# Patient Record
Sex: FEMALE | Employment: FULL TIME | URBAN - METROPOLITAN AREA
[De-identification: names, ages, dates, MRNs, and addresses within clinical notes are randomized per-mention and may not be internally consistent; named-entity substitution may affect disease eponyms.]

---

## 2017-03-23 ENCOUNTER — OFFICE VISIT (OUTPATIENT)
Dept: FAMILY MEDICINE | Facility: CLINIC | Age: 32
End: 2017-03-23

## 2017-03-23 VITALS
TEMPERATURE: 98.2 F | SYSTOLIC BLOOD PRESSURE: 105 MMHG | OXYGEN SATURATION: 96 % | WEIGHT: 148.04 LBS | DIASTOLIC BLOOD PRESSURE: 72 MMHG | HEART RATE: 67 BPM

## 2017-03-23 DIAGNOSIS — J02.9 SORE THROAT: Primary | ICD-10-CM

## 2017-03-23 LAB
BACTERIA SPEC CULT: NORMAL
MICRO REPORT STATUS: NORMAL
S PYO AG THROAT QL IA.RAPID: NEGATIVE
SPECIMEN SOURCE: NORMAL

## 2017-03-23 RX ORDER — SERTRALINE HYDROCHLORIDE 100 MG/1
TABLET, FILM COATED ORAL DAILY
COMMUNITY
End: 2017-09-28

## 2017-03-23 RX ORDER — PENICILLIN V POTASSIUM 500 MG/1
500 TABLET, FILM COATED ORAL 2 TIMES DAILY
Qty: 20 TABLET | Refills: 0 | Status: SHIPPED | OUTPATIENT
Start: 2017-03-23 | End: 2017-09-28

## 2017-03-23 ASSESSMENT — PAIN SCALES - GENERAL: PAINLEVEL: MODERATE PAIN (5)

## 2017-03-23 NOTE — MR AVS SNAPSHOT
After Visit Summary   3/23/2017    Gabby Thomas    MRN: [id number]           Patient Information     Date Of Birth          1985        Visit Information        Provider Department      3/23/2017 11:40 AM Edil Pérez MD [facility]        Today's Diagnoses     Sore throat    -  1      Care Instructions    PLAN:  - Although rapid strep was negative, will treat with Pen for 10 days.   -Return to clinic if no improvement.  -Also, consider scheduling a visit for primary care as needed.         Follow-ups after your visit        Who to contact     Please call your clinic at [phone] to:    Ask questions about your health    Make or cancel appointments    Discuss your medicines    Learn about your test results    Speak to your doctor   If you have compliments or concerns about an experience at your clinic, or if you wish to file a complaint, please contact [facility] Patient Relations at [phone] or email us at [email or web address]         Additional Information About Your Visit        MyChart Information     AIRSIS is an electronic gateway that provides easy, online access to your medical records. With AIRSIS, you can request a clinic appointment, read your test results, renew a prescription or communicate with your care team.     To sign up for AIRSIS visit the website at [email or web address]   You will be asked to enter the access code listed below, as well as some personal information. Please follow the directions to create your username and password.     Your access code is: [email or web address]  Expires: 2017 10:06 AM     Your access code will  in 90 days. If you need help or a new code, please contact your [facility] or call [phone] for assistance.        Care EveryWhere ID     This is your Care EveryWhere ID. This could be used by other organizations to access your  [place] medical records  [id number]        Your Vitals Were     Pulse Temperature Last Period Pulse Oximetry Breastfeeding?       67 98.2  F (36.8  C) 02/23/2017 96% No        Blood Pressure from Last 3 Encounters:   03/23/17 105/72    Weight from Last 3 Encounters:   03/23/17 148 lb 0.6 oz (67.2 kg)              We Performed the Following     Rapid Strep Screen (Group) ([place])     Throat Culture Aerobic Bacterial          Today's Medication Changes          These changes are accurate as of: 3/23/17 12:03 PM.  If you have any questions, ask your nurse or doctor.               Start taking these medicines.        Dose/Directions    penicillin V potassium 500 MG tablet   Commonly known as:  VEETID   Used for:  Sore throat   Started by:  Edil Pérez MD        Dose:  500 mg   Take 1 tablet (500 mg) by mouth 2 times daily   Quantity:  20 tablet   Refills:  0            Where to get your medicines      These medications were sent to [place] [address] [place] [address]  [address] [place] [address]     Phone:  [phone]     penicillin V potassium 500 MG tablet                Primary Care Provider    No Pcp Confirmed       No address on file        Thank you!     Thank you for choosing [facility]  for your care. Our goal is always to provide you with excellent care. Hearing back from our patients is one way we can continue to improve our services. Please take a few minutes to complete the written survey that you may receive in the mail after your visit with us. Thank you!             Your Updated Medication List - Protect others around you: Learn how to safely use, store and throw away your medicines at www.disposemymeds.org.          This list is accurate as of: 3/23/17 12:03 PM.  Always use your most recent med list.                   Brand Name Dispense Instructions for use    MULTIVITAMINS PO          penicillin V potassium 500 MG tablet    VEETID    20 tablet     Take 1 tablet (500 mg) by mouth 2 times daily       sertraline 100 MG tablet    ZOLOFT     Take by mouth daily

## 2017-03-23 NOTE — LETTER
[employer] Customer Service  [facility]  [address], [address]  [place] [address]  Fax: [phone]  Phone: [phone]      2017      Zach Thomas  [address] [place] [facility] [address]        Dear Zach,    Thank you for your interest in becoming a [employer] user!    Your access code is: [email or web address]  Expires: 2017 10:06 AM     Please access the [employer] website at [email or web address].  Below the ID and password fields, select the  Sign Up Now  as New User.  You will be prompted to enter the access code listed above as well as additional personal information.  Please follow the directions carefully when creating your username and password.    If you allow your access code to , or if you have any questions please call a [employer] Representative at [phone] during normal clinic hours.     Sincerely,      [employer] Customer Service  [facility]

## 2017-03-23 NOTE — PROGRESS NOTES
Gabby Thomas is a 21 year old female who presents to [facility] for her first visit.   Concern is sore throat for 2 days. Her  went to Emergency room with Strep. Daughter also tested positive for Strep.   Her 1.5 year old son has a URI.    Review Of Systems:  Has otherwise been in usual state of health, e.g.   Cardiovascular: negative  Respiratory: No shortness of breath, dyspnea on exertion, cough, or hemoptysis  Gastrointestinal: negative  Genitourinary: negative    Problem list per EMR:  There is no problem list on file for this patient.      Current Outpatient Prescriptions   Medication Sig Dispense Refill     sertraline (ZOLOFT) 100 MG tablet Take by mouth daily       Multiple Vitamin (MULTIVITAMINS PO)          Allergies no known allergies     Social:    From [place]. 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     EXAM  Vitals: /72 (BP Location: Left arm, Patient Position: Chair, Cuff Size: Adult Large)  Pulse 67  Temp 98.2  F (36.8  C)  Wt 148 lb 0.6 oz (67.2 kg)  LMP 02/23/2017  SpO2 96%  Breastfeeding? No  BMI= There is no height or weight on file to calculate BMI.  Appears well, but tonsils are enlarged, and red. No exudate. No noticeable lymphadenopathy. Neck is supple.  Tms are normal  CV - RRR. No murmur   Lungs- CTA    ASSESSMENT:  -Pharyngitis in a 32 yo who has 2 family members with Lab confirmed positive Strep    PLAN:  - Although rapid strep was negative, will treat with Pen for 10 days.   -Return to clinic if no improvement.  -Also, consider scheduling a visit for primary care as needed.       --Edil Pérez MD  [facility], Department of Family Medicine and Community Health

## 2017-03-23 NOTE — NURSING NOTE
21 year old  Chief Complaint   Patient presents with     Pharyngitis     Both her  and daugther was diagnosed was Strep. She developed throat pain x2 days ago.       Blood pressure 105/72, pulse 67, temperature 98.2  F (36.8  C), weight 148 lb 0.6 oz (67.2 kg), last menstrual period 02/23/2017, SpO2 96 %, not currently breastfeeding. There is no height or weight on file to calculate BMI.  There is no problem list on file for this patient.      Wt Readings from Last 2 Encounters:   03/23/17 148 lb 0.6 oz (67.2 kg)     BP Readings from Last 3 Encounters:   03/23/17 105/72         Current Outpatient Prescriptions   Medication     sertraline (ZOLOFT) 100 MG tablet     Multiple Vitamin (MULTIVITAMINS PO)     No current facility-administered medications for this visit.        Social History   Substance Use Topics     Smoking status: Never Smoker     Smokeless tobacco: Not on file     Alcohol use Yes       Health Maintenance Due   Topic Date Due     CHLAMYDIA SCREENING  08/14/1994     HPV IMMUNIZATION (1 of 3 - Female 3 Dose Series) 08/14/2005     TETANUS IMMUNIZATION (SYSTEM ASSIGNED)  08/14/2012     PAP SCREENING Q3 YR (SYSTEM ASSIGNED)  08/14/2015       No results found for: PAP      Yessenia Levy, JOSE LUIS  March 23, 2017 11:32 AM

## 2017-03-23 NOTE — PATIENT INSTRUCTIONS
PLAN:  - Although rapid strep was negative, will treat with Pen for 10 days.   -Return to clinic if no improvement.  -Also, consider scheduling a visit for primary care as needed.

## 2017-09-28 ENCOUNTER — OFFICE VISIT (OUTPATIENT)
Dept: OBGYN | Facility: CLINIC | Age: 32
End: 2017-09-28
Attending: OBSTETRICS & GYNECOLOGY
Payer: COMMERCIAL

## 2017-09-28 VITALS
HEIGHT: 57 IN | SYSTOLIC BLOOD PRESSURE: 111 MMHG | BODY MASS INDEX: 32.15 KG/M2 | DIASTOLIC BLOOD PRESSURE: 72 MMHG | WEIGHT: 149 LBS

## 2017-09-28 DIAGNOSIS — Z11.3 ROUTINE SCREENING FOR STI (SEXUALLY TRANSMITTED INFECTION): ICD-10-CM

## 2017-09-28 DIAGNOSIS — R68.89 HEAT INTOLERANCE: ICD-10-CM

## 2017-09-28 DIAGNOSIS — M54.50 CHRONIC BILATERAL LOW BACK PAIN WITHOUT SCIATICA: ICD-10-CM

## 2017-09-28 DIAGNOSIS — G89.29 CHRONIC BILATERAL LOW BACK PAIN WITHOUT SCIATICA: ICD-10-CM

## 2017-09-28 DIAGNOSIS — Z30.431 IUD CHECK UP: ICD-10-CM

## 2017-09-28 DIAGNOSIS — K52.9 CHRONIC DIARRHEA: ICD-10-CM

## 2017-09-28 DIAGNOSIS — Z00.00 ROUTINE GENERAL MEDICAL EXAMINATION AT A HEALTH CARE FACILITY: Primary | ICD-10-CM

## 2017-09-28 PROCEDURE — 99212 OFFICE O/P EST SF 10 MIN: CPT | Mod: ZF

## 2017-09-28 PROCEDURE — 87591 N.GONORRHOEAE DNA AMP PROB: CPT | Performed by: OBSTETRICS & GYNECOLOGY

## 2017-09-28 PROCEDURE — 87491 CHLMYD TRACH DNA AMP PROBE: CPT | Performed by: OBSTETRICS & GYNECOLOGY

## 2017-09-28 RX ORDER — COPPER 313.4 MG/1
1 INTRAUTERINE DEVICE INTRAUTERINE ONCE
COMMUNITY
End: 2021-12-01

## 2017-09-28 ASSESSMENT — PAIN SCALES - GENERAL: PAINLEVEL: SEVERE PAIN (6)
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
## 2017-09-28 NOTE — MR AVS SNAPSHOT
After Visit Summary   9/28/2017    Gabby Lennon    MRN: [id number]           Patient Information     Date Of Birth          1985        Visit Information        Provider Department      9/28/2017 3:45 PM Marta Ross MD [facility]        Today's Diagnoses     Heat intolerance    -  1    Chronic diarrhea        Routine screening for STI (sexually transmitted infection)        Chronic bilateral low back pain without sciatica        IUD check up          Care Instructions    Go to lab for blood work, urine and stool sample    Schedule pelvic Ultrasound    Physical therapy will call to schedule an appointment    Follow up with internal medicine regarding diarrhea and flushing          Follow-ups after your visit        Additional Services     [facility] Referral       **This order will print in the [facility] Scheduling Office**    Physical Therapy, Hand Therapy and Chiropractic Care are available through:    *[facility]  *[facility]  *[facility]    Call one number to schedule at any of the above locations: [phone].    Your provider has referred you to: Physical Therapy at [facility] or [facility]    Indication/Reason for Referral: Low Back Pain  Onset of Illness: 4 years  Therapy Orders: Evaluate and Treat  Special Programs: None  Special Request: None    Kj Underwood      Additional Comments for the Therapist or Chiropractor: none    Please be aware that coverage of these services is subject to the terms and limitations of your health insurance plan.  Call member services at your health plan with any benefit or coverage questions.      Please bring the following to your appointment:    *Your personal calendar for scheduling future appointments  *Comfortable clothing                  Future tests that were ordered for you today     Open Future Orders        Priority Expected Expires Ordered    US GYN  "Complete Transvaginal - 28010 (In Clinic) Routine  1/26/2018 9/28/2017    5-HIAA quantitative urine Routine  10/28/2017 9/28/2017    Ova and Parasite Exam Routine Routine  9/28/2018 9/28/2017            Who to contact     Please call your clinic at [phone] to:    Ask questions about your health    Make or cancel appointments    Discuss your medicines    Learn about your test results    Speak to your doctor   If you have compliments or concerns about an experience at your clinic, or if you wish to file a complaint, please contact [facility] Patient Relations at [phone] or email us at [email or web address]         Additional Information About Your Visit        Care EveryWhere ID     This is your Care EveryWhere ID. This could be used by other organizations to access your [place] medical records  [id number]        Your Vitals Were     Height Last Period BMI (Body Mass Index)             1.448 m (4' 9\") 09/04/2017 32.24 kg/m2          Blood Pressure from Last 3 Encounters:   09/28/17 111/72   03/23/17 105/72   01/21/16 106/72    Weight from Last 3 Encounters:   09/28/17 67.6 kg (149 lb)   03/23/17 67.2 kg (148 lb 0.6 oz)   01/21/16 64 kg (141 lb)              We Performed the Following     Anti Treponema     CBC with platelets     Chlamydia PCR (Clinic Collect)     Gonorrhea PCR     Hepatitis C antibody     HIV Antigen Antibody Combo     [facility] Referral     TSH with free T4 reflex          Today's Medication Changes          These changes are accurate as of: 9/28/17  4:41 PM.  If you have any questions, ask your nurse or doctor.               These medicines have changed or have updated prescriptions.        Dose/Directions    sertraline 100 MG tablet   Commonly known as:  ZOLOFT   This may have changed:  Another medication with the same name was removed. Continue taking this medication, and follow the directions you see here.   Used for:  Mixed emotional " features as adjustment reaction   Changed by:  Tonie Paulson MD        Dose:  100 mg   Take 1 tablet (100 mg) by mouth daily   Quantity:  90 tablet   Refills:  3         Stop taking these medicines if you haven't already. Please contact your care team if you have questions.     MULTIVITAMINS PO   Stopped by:  Marta Ross MD           naproxen 500 MG tablet   Commonly known as:  NAPROSYN   Stopped by:  Marta Ross MD           penicillin V potassium 500 MG tablet   Commonly known as:  VEETID   Stopped by:  Marta Ross MD                    Primary Care Provider    None Specified       No primary provider on file.        Equal Access to Services     [facility]: Hadii kirti Prakash, corby rutherford, cole kaalmamaggi davis, omari mcdaniels . So [facility] [phone].    ATENCIÓN: Si habla español, tiene a diallo disposición servicios gratuitos de asistencia lingüística. [facility] [phone].    We comply with applicable federal civil rights laws and [place] laws. We do not discriminate on the basis of race, color, national origin, age, disability sex, sexual orientation or gender identity.            Thank you!     Thank you for choosing [facility]  for your care. Our goal is always to provide you with excellent care. Hearing back from our patients is one way we can continue to improve our services. Please take a few minutes to complete the written survey that you may receive in the mail after your visit with us. Thank you!             Your Updated Medication List - Protect others around you: Learn how to safely use, store and throw away your medicines at www.disposemymeds.org.          This list is accurate as of: 9/28/17  4:41 PM.  Always use your most recent med list.                   Brand Name Dispense Instructions for use Diagnosis    paragard intrauterine copper      1 each by Intrauterine route once  Inserted 12-6-2016        prenatal multivitamin plus iron 27-0.8 MG Tabs per tablet     100 tablet    Take 1 tablet by mouth daily    Routine postpartum follow-up       sertraline 100 MG tablet    ZOLOFT    90 tablet    Take 1 tablet (100 mg) by mouth daily    Mixed emotional features as adjustment reaction
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
## 2017-09-29 LAB
C TRACH DNA SPEC QL NAA+PROBE: NEGATIVE
N GONORRHOEA DNA SPEC QL NAA+PROBE: NEGATIVE
SPECIMEN SOURCE: NORMAL
SPECIMEN SOURCE: NORMAL

## 2017-10-02 DIAGNOSIS — Z11.3 ROUTINE SCREENING FOR STI (SEXUALLY TRANSMITTED INFECTION): ICD-10-CM

## 2017-10-02 DIAGNOSIS — K52.9 CHRONIC DIARRHEA: ICD-10-CM

## 2017-10-02 DIAGNOSIS — R68.89 HEAT INTOLERANCE: ICD-10-CM

## 2017-10-02 LAB
ERYTHROCYTE [DISTWIDTH] IN BLOOD BY AUTOMATED COUNT: 13 % (ref 10–15)
HCT VFR BLD AUTO: 40.3 % (ref 35–47)
HCV AB SERPL QL IA: NONREACTIVE
HGB BLD-MCNC: 13.4 G/DL (ref 11.7–15.7)
HIV 1+2 AB+HIV1 P24 AG SERPL QL IA: NONREACTIVE
MCH RBC QN AUTO: 32.1 PG (ref 26.5–33)
MCHC RBC AUTO-ENTMCNC: 33.3 G/DL (ref 31.5–36.5)
MCV RBC AUTO: 97 FL (ref 78–100)
PLATELET # BLD AUTO: 202 10E9/L (ref 150–450)
RBC # BLD AUTO: 4.17 10E12/L (ref 3.8–5.2)
TSH SERPL DL<=0.005 MIU/L-ACNC: 1.18 MU/L (ref 0.4–4)
WBC # BLD AUTO: 6.4 10E9/L (ref 4–11)

## 2017-10-02 PROCEDURE — 86780 TREPONEMA PALLIDUM: CPT | Performed by: OBSTETRICS & GYNECOLOGY

## 2017-10-02 PROCEDURE — 84443 ASSAY THYROID STIM HORMONE: CPT | Performed by: OBSTETRICS & GYNECOLOGY

## 2017-10-02 PROCEDURE — 85027 COMPLETE CBC AUTOMATED: CPT | Performed by: OBSTETRICS & GYNECOLOGY

## 2017-10-02 PROCEDURE — 87389 HIV-1 AG W/HIV-1&-2 AB AG IA: CPT | Performed by: OBSTETRICS & GYNECOLOGY

## 2017-10-02 PROCEDURE — 86803 HEPATITIS C AB TEST: CPT | Performed by: OBSTETRICS & GYNECOLOGY

## 2017-10-02 PROCEDURE — 36415 COLL VENOUS BLD VENIPUNCTURE: CPT | Performed by: OBSTETRICS & GYNECOLOGY

## 2017-10-03 ENCOUNTER — HOSPITAL ENCOUNTER (OUTPATIENT)
Facility: CLINIC | Age: 32
Setting detail: SPECIMEN
Discharge: HOME | End: 2017-10-03
Admitting: OBSTETRICS & GYNECOLOGY
Payer: COMMERCIAL

## 2017-10-03 LAB — T PALLIDUM IGG+IGM SER QL: NEGATIVE

## 2017-10-03 PROCEDURE — 83497 ASSAY OF 5-HIAA: CPT | Performed by: OBSTETRICS & GYNECOLOGY

## 2017-10-03 PROCEDURE — 87209 SMEAR COMPLEX STAIN: CPT | Performed by: OBSTETRICS & GYNECOLOGY

## 2017-10-03 PROCEDURE — 87177 OVA AND PARASITES SMEARS: CPT | Performed by: OBSTETRICS & GYNECOLOGY

## 2017-10-05 LAB
O+P STL MICRO: NORMAL
O+P STL MICRO: NORMAL
SPECIMEN SOURCE: NORMAL

## 2017-10-08 LAB
5HIAA & CREATININE UR-IMP: ABNORMAL
5OH-INDOLEACETATE 24H UR-MCNC: 1.5 MG/L
5OH-INDOLEACETATE 24H UR-MRATE: 1 MG/D (ref 0–15)
5OH-INDOLEACETATE/CREAT 24H UR: 4 MG/GCR (ref 0–14)
COLLECT DURATION TIME UR: 24 HR
CREAT 24H UR-MRATE: 246 MG/D (ref 700–1600)
CREAT SERPL-MCNC: 41 MG/DL
SPECIMEN VOL ?TM UR: 600 ML

## 2017-10-23 ENCOUNTER — THERAPY VISIT (OUTPATIENT)
Dept: PHYSICAL THERAPY | Facility: CLINIC | Age: 32
End: 2017-10-23
Payer: COMMERCIAL

## 2017-10-23 DIAGNOSIS — M54.2 CERVICALGIA: Primary | ICD-10-CM

## 2017-10-23 DIAGNOSIS — M54.50 LUMBAGO: ICD-10-CM

## 2017-10-23 PROCEDURE — 97112 NEUROMUSCULAR REEDUCATION: CPT | Mod: GP | Performed by: PHYSICAL THERAPIST

## 2017-10-23 PROCEDURE — 97110 THERAPEUTIC EXERCISES: CPT | Mod: GP | Performed by: PHYSICAL THERAPIST

## 2017-10-23 PROCEDURE — 97161 PT EVAL LOW COMPLEX 20 MIN: CPT | Mod: GP | Performed by: PHYSICAL THERAPIST

## 2017-10-23 PROCEDURE — 97140 MANUAL THERAPY 1/> REGIONS: CPT | Mod: GP | Performed by: PHYSICAL THERAPIST

## 2017-10-23 NOTE — MR AVS SNAPSHOT
After Visit Summary   10/23/2017    Gabby Lennon    MRN: [id number]           Patient Information     Date Of Birth          1985        Visit Information        Provider Department      10/23/2017 10:10 AM Peggy Melendez, PT [place] [facility]        Today's Diagnoses     Cervicalgia    -  1    Lumbago           Follow-ups after your visit        Your next 10 appointments already scheduled     Nov 08, 2017 10:10 AM CST   [facility] with Rola Crooks PT   [place] [facility] ([facility] [place]  )    [address]  [facility] [address] [phone]              Who to contact     If you have questions or need follow up information about today's clinic visit or your schedule please contact [place] [facility] directly at [phone].  Normal or non-critical lab and imaging results will be communicated to you by MyChart, letter or phone within 4 business days after the clinic has received the results. If you do not hear from us within 7 days, please contact the clinic through MyChart or phone. If you have a critical or abnormal lab result, we will notify you by phone as soon as possible.  Submit refill requests through [email or web address] or call your pharmacy and they will forward the refill request to us. Please allow 3 business days for your refill to be completed.          Additional Information About Your Visit        Care EveryWhere ID     This is your Care EveryWhere ID. This could be used by other organizations to access your [place] medical records  [id number]        Your Vitals Were     Last Period                   09/04/2017            Blood Pressure from Last 3 Encounters:   09/28/17 111/72   03/23/17 105/72   01/21/16 106/72    Weight from Last 3 Encounters:   09/28/17 67.6 kg (149 lb)   03/23/17 67.2 kg (148 lb 0.6 oz)   01/21/16 64 kg (141 lb)               We Performed the Following     [facility] Inital Eval Report     Manual Ther Tech, 1+Regions, EA 15 min     Neuromuscular Re-Education     PT Eval, Low Complexity (46737)     Therapeutic Exercises        Primary Care Provider Office Phone # Fax #    Marta Cal Ross [phone]       [address] [facility] [address]  [facility] [address]        Equal Access to Services     [facility]: Hadii aad ku hadasho Soomaali, waaxda luqadaha, qaybta kaalmada adeegyada, waxay idiin hayaan adeeg kharash la'aan ah. So [facility] [phone].    ATENCIÓN: Si habla espfior, tiene a diallo disposición servicios gratuitos de asistencia lingüística. Joao al [phone].    We comply with applicable federal civil rights laws and [place] laws. We do not discriminate on the basis of race, color, national origin, age, disability, sex, sexual orientation, or gender identity.            Thank you!     Thank you for choosing [place] [facility]  for your care. Our goal is always to provide you with excellent care. Hearing back from our patients is one way we can continue to improve our services. Please take a few minutes to complete the written survey that you may receive in the mail after your visit with us. Thank you!             Your Updated Medication List - Protect others around you: Learn how to safely use, store and throw away your medicines at www.disposemymeds.org.          This list is accurate as of: 10/23/17 11:48 AM.  Always use your most recent med list.                   Brand Name Dispense Instructions for use Diagnosis    paragard intrauterine copper      1 each by Intrauterine route once Inserted 12-6-2016        prenatal multivitamin plus iron 27-0.8 MG Tabs per tablet     100 tablet    Take 1 tablet by mouth daily    Routine postpartum follow-up       sertraline 100 MG tablet    ZOLOFT    90 tablet    Take 1 tablet (100 mg) by mouth daily    Mixed emotional features as adjustment  reaction
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
## 2017-10-24 NOTE — PROGRESS NOTES
Subjective:    Patient is a 32 year old female presenting with rehab left ankle/foot hpi.                                      Pertinent medical history includes:  Depression and other (anxiety).    Other surgeries include:  Orthopedic surgery (hand).  Current medications:  Anti-depressants.  Current occupation is Student  .    Primary job tasks include:  Lifting, repetitive tasks, other and driving (pushing/pulling).    Barriers include:  None as reported by patient.    Red flags:  None as reported by patient.      Oswestry Score: 34 %                 Objective:    System    Physical Exam    General     ROS    Assessment/Plan:

## 2017-12-01 PROBLEM — M54.2 CERVICALGIA: Status: RESOLVED | Noted: 2017-10-23 | Resolved: 2017-12-01

## 2017-12-01 PROBLEM — M54.50 LUMBAGO: Status: RESOLVED | Noted: 2017-10-23 | Resolved: 2017-12-01

## 2017-12-01 NOTE — PROGRESS NOTES
Subjective:    HPI  Oswestry Score: 34 %                 Objective:    System    Physical Exam    General     ROS    Assessment/Plan:      DISCHARGE REPORT    Progress reporting period is from 10/23/2017 .       SUBJECTIVE  Subjective changes noted by patient:  Unknown as patient canceled follow ups visits due to conflicts.      Current pain level is NA  .     Previous pain level was   Initial Pain level: 4/10.   Changes in function:  unknown  Adverse reaction to treatment or activity: None    OBJECTIVE  Changes noted in objective findings:  Patient has failed to return to therapy so current objective findings are unknown.  O  ASSESSMENT/PLAN  Updated problem list and treatment plan: Diagnosis 1:  Cervical pain  Pain -  manual therapy, self management, education and home program  Decreased ROM/flexibility - manual therapy, therapeutic exercise and home program  Decreased strength - therapeutic exercise, therapeutic activities and home program  Decreased function - therapeutic activities and home program  Diagnosis 2:  Back pain   Pain -  self management, education and home program  Decreased ROM/flexibility - manual therapy, therapeutic exercise and home program  Impaired muscle performance - neuro re-education and home program  Decreased function - therapeutic activities and home program  STG/LTGs have been met or progress has been made towards goals:  unknown  Assessment of Progress: The patient's condition has potential to improve.  The patient has not returned to therapy. Current status is unknown.  Self Management Plans:  Patient has been instructed in a home treatment program.    Gabby continues to require the following intervention to meet STG and LTG's:  Patient needs to continue to work on the home exercise program.      Recommendations:  Will discharge since patient has not rescheduled after canceling her visits.    Please refer to the daily flowsheet for treatment today, total treatment time and time  spent performing 1:1 timed codes.

## 2017-12-26 DIAGNOSIS — F43.29 MIXED EMOTIONAL FEATURES AS ADJUSTMENT REACTION: ICD-10-CM

## 2017-12-26 RX ORDER — SERTRALINE HYDROCHLORIDE 100 MG/1
100 TABLET, FILM COATED ORAL DAILY
Qty: 90 TABLET | Refills: 3 | Status: SHIPPED | OUTPATIENT
Start: 2017-12-26 | End: 2018-11-06

## 2017-12-26 NOTE — TELEPHONE ENCOUNTER
Received refill request for sertraline.  Last in clinic 9/2017 for annual with Dr. Ross.  Please review.

## 2017-12-27 ENCOUNTER — OFFICE VISIT (OUTPATIENT)
Dept: OBGYN | Facility: CLINIC | Age: 32
End: 2017-12-27
Attending: NURSE PRACTITIONER
Payer: COMMERCIAL

## 2017-12-27 VITALS
SYSTOLIC BLOOD PRESSURE: 106 MMHG | BODY MASS INDEX: 33.02 KG/M2 | DIASTOLIC BLOOD PRESSURE: 63 MMHG | HEART RATE: 69 BPM | WEIGHT: 152.6 LBS

## 2017-12-27 DIAGNOSIS — Z11.3 SCREEN FOR STD (SEXUALLY TRANSMITTED DISEASE): ICD-10-CM

## 2017-12-27 DIAGNOSIS — R10.2 PELVIC PAIN IN FEMALE: Primary | ICD-10-CM

## 2017-12-27 LAB
CLUE CELLS: NORMAL
HCG UR QL: NEGATIVE
INTERNAL QC OK POCT: YES
TRICHOMONAS (WET PREP): NORMAL
YEAST (WET PREP): NORMAL

## 2017-12-27 PROCEDURE — 99212 OFFICE O/P EST SF 10 MIN: CPT | Mod: ZF

## 2017-12-27 PROCEDURE — 87591 N.GONORRHOEAE DNA AMP PROB: CPT | Performed by: NURSE PRACTITIONER

## 2017-12-27 PROCEDURE — 81025 URINE PREGNANCY TEST: CPT | Mod: ZF | Performed by: NURSE PRACTITIONER

## 2017-12-27 PROCEDURE — 87491 CHLMYD TRACH DNA AMP PROBE: CPT | Performed by: NURSE PRACTITIONER

## 2017-12-27 PROCEDURE — 87210 SMEAR WET MOUNT SALINE/INK: CPT | Mod: ZF | Performed by: NURSE PRACTITIONER

## 2017-12-27 NOTE — MR AVS SNAPSHOT
After Visit Summary   12/27/2017    Gabby Lennon    MRN: [id number]           Patient Information     Date Of Birth          1985        Visit Information        Provider Department      12/27/2017 10:00 AM Zehra Blake APRN CNP [facility]        Today's Diagnoses     Pelvic pain in female    -  1    Screen for STD (sexually transmitted disease)          Care Instructions    Schedule pelvic ultrasound today before leaving clinic.  There are openings tomorrow morning.    Schedule an appointment with Dr. Ortega for further evaluation of your diarrhea.     May try heat and ibuprofen to relieve pain at this time.          Follow-ups after your visit        Future tests that were ordered for you today     Open Future Orders        Priority Expected Expires Ordered    US GYN Complete Transvaginal - 29850 Routine  12/27/2018 12/27/2017            Who to contact     Please call your clinic at [phone] to:    Ask questions about your health    Make or cancel appointments    Discuss your medicines    Learn about your test results    Speak to your doctor   If you have compliments or concerns about an experience at your clinic, or if you wish to file a complaint, please contact [facility] Patient Relations at [phone] or email us at [email or web address]         Additional Information About Your Visit        Care EveryWhere ID     This is your Care EveryWhere ID. This could be used by other organizations to access your [place] medical records  [id number]        Your Vitals Were     Pulse Last Period Breastfeeding? BMI (Body Mass Index)          69 12/10/2017 No 33.02 kg/m2         Blood Pressure from Last 3 Encounters:   12/27/17 106/63   09/28/17 111/72   03/23/17 105/72    Weight from Last 3 Encounters:   12/27/17 69.2 kg (152 lb 9.6 oz)   09/28/17 67.6 kg (149 lb)   03/23/17 67.2 kg (148 lb 0.6 oz)              We  Performed the Following     Chlamydia PCR     Gonorrhea PCR     hCG qual urine POCT     Wet Prep POCT        Primary Care Provider Office Phone # Fax #    Marta Cal Ross [phone]       [address] [facility] [address]  [facility] [address]        Equal Access to Services     PAVITHRA JONATHAN : Hadii aad ku hadasho Soomaali, waaxda luqadaha, qaybta kaalmada adeegyada, waxay ramirezin zinan lucian fifi arslan . So [facility] [phone].    ATENCIÓN: Si habla español, tiene a diallo disposición servicios gratuitos de asistencia lingüística. Llame al [phone].    We comply with applicable federal civil rights laws and [place] laws. We do not discriminate on the basis of race, color, national origin, age, disability, sex, sexual orientation, or gender identity.            Thank you!     Thank you for choosing [facility]  for your care. Our goal is always to provide you with excellent care. Hearing back from our patients is one way we can continue to improve our services. Please take a few minutes to complete the written survey that you may receive in the mail after your visit with us. Thank you!             Your Updated Medication List - Protect others around you: Learn how to safely use, store and throw away your medicines at www.disposemymeds.org.          This list is accurate as of: 12/27/17 11:12 AM.  Always use your most recent med list.                   Brand Name Dispense Instructions for use Diagnosis    paragard intrauterine copper      1 each by Intrauterine route once Inserted 12-6-2016        prenatal multivitamin plus iron 27-0.8 MG Tabs per tablet     100 tablet    Take 1 tablet by mouth daily    Routine postpartum follow-up       sertraline 100 MG tablet    ZOLOFT    90 tablet    Take 1 tablet (100 mg) by mouth daily    Mixed emotional features as adjustment reaction
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
## 2017-12-27 NOTE — LETTER
12/29/2017         Gabby Bagleyarez   [address] [facility] [place] [address]        Dear Ms. Lennon:    The results of your recent test(s) listed below were normal.     Results for orders placed or performed in visit on 12/27/17   Wet Prep POCT   Result Value Ref Range    Trichomonas Wet Prep NEG     Clue cells NEG     Yeast Wet Prep NEG    hCG qual urine POCT   Result Value Ref Range    HCG Qual Urine Negative neg    Internal QC OK Yes    Gonorrhea PCR   Result Value Ref Range    Specimen Descrip Cervix     N Gonorrhea PCR Negative NEG^Negative   Chlamydia PCR   Result Value Ref Range    Specimen Description Cervix     Chlamydia Trachomatis PCR Negative NEG^Negative         Please note that test explanations are brief and do not reflect all diagnostic uses.  If you have any questions or concerns, please call the clinic at [phone].      Sincerely,      Zehra Blake, ELIZABETH, APRN, WHNP

## 2017-12-27 NOTE — PATIENT INSTRUCTIONS
Schedule pelvic ultrasound today before leaving clinic.      Schedule an appointment with Dr. Ortega for further evaluation of your diarrhea.     May try heat and ibuprofen to relieve pain at this time.

## 2017-12-27 NOTE — NURSING NOTE
Chief Complaint   Patient presents with     Follow Up For     Has a Paragard IUD. LMP 12/10. Has lower left abd pain describes as cramps and also brown spotting.       See JOSE LUIS Molina 12/27/2017

## 2018-01-12 ENCOUNTER — OFFICE VISIT (OUTPATIENT)
Dept: OBGYN | Facility: CLINIC | Age: 33
End: 2018-01-12
Attending: NURSE PRACTITIONER
Payer: COMMERCIAL

## 2018-01-12 DIAGNOSIS — R10.2 PELVIC PAIN IN FEMALE: ICD-10-CM

## 2018-01-12 NOTE — PROGRESS NOTES
32 year old female with LMP 01/08/2018, presents for gynecologic ultrasound indicated by left pelvic pain, Paragard IUD.  This study was done transvaginally.    Uterine findings:   Presence: Visible Size: Normal 5.6x5.4x4.7 cm.  Endometrium = 3.3 mm. IUD in place.   Cx length = 47.9 mm.      Flexion:  Midposition Position: Midline Margins: Smooth Shape: Normal   Contour: Regular Texture: Homogeneous Cavity: Normal Masses: Normal    Pelvic findings:    Right Adnexa: Normal   Left Adnexa: Normal   Bladder:  Normal         Cul - de - sac fluid: None    Ovarian follicles:   Right ovary:  3.3x2.0x2.0cm.     Small follicles     Size(s):  Less than 5mm     Left ovary:  2.8x3.0x1.7cm.     Multiple small follicles     Size(s):  Less than 5mm      Comments:  IUD in situ, at fundus.      MARIUM Jaramillo MD MPH

## 2018-01-12 NOTE — LETTER
1/12/2018       RE: Gabby Lennon  [address] [facility] [place] [address]     Dear Colleague,    Thank you for referring your patient, Gabby Lennon, to the [facility] at [facility]. Please see a copy of my visit note below.    32 year old female with LMP 01/08/2018, presents for gynecologic ultrasound indicated by left pelvic pain, Paragard IUD.  This study was done transvaginally.    Uterine findings:   Presence: Visible Size: Normal 5.6x5.4x4.7 cm.  Endometrium = 3.3 mm. IUD in place.   Cx length = 47.9 mm.      Flexion:  Midposition Position: Midline Margins: Smooth Shape: Normal   Contour: Regular Texture: Homogeneous Cavity: Normal Masses: Normal    Pelvic findings:    Right Adnexa: Normal   Left Adnexa: Normal   Bladder:  Normal         Cul - de - sac fluid: None    Ovarian follicles:   Right ovary:  3.3x2.0x2.0cm.     Small follicles     Size(s):  Less than 5mm     Left ovary:  2.8x3.0x1.7cm.     Multiple small follicles     Size(s):  Less than 5mm      Comments:  IUD in situ, at fundus.      MARIUM Jaramillo MD MPH          Again, thank you for allowing me to participate in the care of your patient.      Sincerely,    [id number]

## 2018-01-12 NOTE — MR AVS SNAPSHOT
After Visit Summary   2018    Gabby Lennon    MRN: [id number]           Patient Information     Date Of Birth          1985        Visit Information        Provider Department      2018 1:00 PM [facility]        Today's Diagnoses     Pelvic pain in female           Follow-ups after your visit        Your next 10 appointments already scheduled     2018 10:00 AM CST   (Arrive by 9:45 AM)   New Patient Visit with Brittany Boone   [facility])    [address] [place] [address]  5th [facility] [address] [phone]              Who to contact     Please call your clinic at [phone] to:    Ask questions about your health    Make or cancel appointments    Discuss your medicines    Learn about your test results    Speak to your doctor   If you have compliments or concerns about an experience at your clinic, or if you wish to file a complaint, please contact [facility] Patient Relations at [phone] or email us at [email or web address]         Additional Information About Your Visit        MyChart Information     [employer] is an electronic gateway that provides easy, online access to your medical records. With [employer], you can request a clinic appointment, read your test results, renew a prescription or communicate with your care team.     To sign up for [employer] visit the website at [email or web address]   You will be asked to enter the access code listed below, as well as some personal information. Please follow the directions to create your username and password.     Your access code is: [email or web address]  Expires: 2018  6:31 AM     Your access code will  in 90 days. If you need help or a new code, please contact your [facility] or call [phone] for assistance.        Care EveryWhere ID     This is  your Care EveryWhere ID. This could be used by other organizations to access your [facility] medical records  [id number]        Your Vitals Were     Last Period                   12/10/2017            Blood Pressure from Last 3 Encounters:   12/27/17 106/63   09/28/17 111/72   03/23/17 105/72    Weight from Last 3 Encounters:   12/27/17 69.2 kg (152 lb 9.6 oz)   09/28/17 67.6 kg (149 lb)   03/23/17 67.2 kg (148 lb 0.6 oz)              We Performed the Following     US GYN Complete Transvaginal - [id number]        Primary Care Provider Office Phone # Fax #    Marta Cal Ross [phone]       [address] [address]        Equal Access to Services     [facility]: Hadii aad sandeep hadasho Somary, waaxda luqadaha, qaybta kaalmada adeegyada, omari mcdaniels . So [facility] [phone].    ATENCIÓN: Si habla español, tiene a diallo disposición servicios gratuitos de asistencia lingüística. Joao al [phone].    We comply with applicable federal civil rights laws and [place] laws. We do not discriminate on the basis of race, color, national origin, age, disability, sex, sexual orientation, or gender identity.            Thank you!     Thank you for choosing [facility]  for your care. Our goal is always to provide you with excellent care. Hearing back from our patients is one way we can continue to improve our services. Please take a few minutes to complete the written survey that you may receive in the mail after your visit with us. Thank you!             Your Updated Medication List - Protect others around you: Learn how to safely use, store and throw away your medicines at www.disposemymeds.org.          This list is accurate as of: 1/12/18  1:47 PM.  Always use your most recent med list.                   Brand Name Dispense Instructions for use Diagnosis    paragard intrauterine copper      1 each by Intrauterine route once Inserted  12-6-2016        prenatal multivitamin plus iron 27-0.8 MG Tabs per tablet     100 tablet    Take 1 tablet by mouth daily    Routine postpartum follow-up       sertraline 100 MG tablet    ZOLOFT    90 tablet    Take 1 tablet (100 mg) by mouth daily    Mixed emotional features as adjustment reaction

## 2018-01-26 ENCOUNTER — OFFICE VISIT (OUTPATIENT)
Dept: INTERNAL MEDICINE | Facility: CLINIC | Age: 33
End: 2018-01-26
Attending: INTERNAL MEDICINE
Payer: COMMERCIAL

## 2018-01-26 VITALS — SYSTOLIC BLOOD PRESSURE: 101 MMHG | BODY MASS INDEX: 31.9 KG/M2 | DIASTOLIC BLOOD PRESSURE: 68 MMHG | WEIGHT: 147.4 LBS

## 2018-01-26 DIAGNOSIS — R19.7 DIARRHEA, UNSPECIFIED TYPE: Primary | ICD-10-CM

## 2018-01-26 DIAGNOSIS — M79.10 MUSCLE PAIN: ICD-10-CM

## 2018-01-26 LAB
ALBUMIN SERPL-MCNC: 4.2 G/DL (ref 3.4–5)
ALP SERPL-CCNC: 78 U/L (ref 40–150)
ALT SERPL W P-5'-P-CCNC: 22 U/L (ref 0–50)
ANION GAP SERPL CALCULATED.3IONS-SCNC: 7 MMOL/L (ref 3–14)
AST SERPL W P-5'-P-CCNC: 13 U/L (ref 0–45)
BILIRUB SERPL-MCNC: 0.4 MG/DL (ref 0.2–1.3)
BUN SERPL-MCNC: 9 MG/DL (ref 7–30)
CALCIUM SERPL-MCNC: 9 MG/DL (ref 8.5–10.1)
CHLORIDE SERPL-SCNC: 105 MMOL/L (ref 94–109)
CK SERPL-CCNC: 89 U/L (ref 30–225)
CO2 SERPL-SCNC: 28 MMOL/L (ref 20–32)
CREAT SERPL-MCNC: 0.52 MG/DL (ref 0.52–1.04)
CRP SERPL-MCNC: <2.9 MG/L (ref 0–8)
GFR SERPL CREATININE-BSD FRML MDRD: >90 ML/MIN/1.7M2
GLUCOSE SERPL-MCNC: 86 MG/DL (ref 70–99)
POTASSIUM SERPL-SCNC: 4 MMOL/L (ref 3.4–5.3)
PROT SERPL-MCNC: 8 G/DL (ref 6.8–8.8)
SODIUM SERPL-SCNC: 140 MMOL/L (ref 133–144)
T4 FREE SERPL-MCNC: 0.95 NG/DL (ref 0.76–1.46)
TSH SERPL DL<=0.005 MIU/L-ACNC: 1.53 MU/L (ref 0.4–4)

## 2018-01-26 PROCEDURE — 82784 ASSAY IGA/IGD/IGG/IGM EACH: CPT | Performed by: INTERNAL MEDICINE

## 2018-01-26 PROCEDURE — 82550 ASSAY OF CK (CPK): CPT | Performed by: INTERNAL MEDICINE

## 2018-01-26 PROCEDURE — 83516 IMMUNOASSAY NONANTIBODY: CPT | Performed by: INTERNAL MEDICINE

## 2018-01-26 PROCEDURE — 36415 COLL VENOUS BLD VENIPUNCTURE: CPT | Performed by: INTERNAL MEDICINE

## 2018-01-26 PROCEDURE — G0463 HOSPITAL OUTPT CLINIC VISIT: HCPCS | Mod: ZF

## 2018-01-26 PROCEDURE — 86140 C-REACTIVE PROTEIN: CPT | Performed by: INTERNAL MEDICINE

## 2018-01-26 PROCEDURE — 84439 ASSAY OF FREE THYROXINE: CPT | Performed by: INTERNAL MEDICINE

## 2018-01-26 PROCEDURE — 86038 ANTINUCLEAR ANTIBODIES: CPT | Performed by: INTERNAL MEDICINE

## 2018-01-26 PROCEDURE — 84443 ASSAY THYROID STIM HORMONE: CPT | Performed by: INTERNAL MEDICINE

## 2018-01-26 PROCEDURE — 86256 FLUORESCENT ANTIBODY TITER: CPT | Performed by: INTERNAL MEDICINE

## 2018-01-26 PROCEDURE — 80053 COMPREHEN METABOLIC PANEL: CPT | Performed by: INTERNAL MEDICINE

## 2018-01-26 NOTE — PATIENT INSTRUCTIONS
High-Fiber Diet  Fiber is in fruits, vegetables, cereals, and grains. Fiber passes through your body undigested. A high-fiber diet helps food move through your intestinal tract. The added bulk is helpful in preventing constipation. In people with diverticulosis, fiber helps clean out the pouches along the colon wall. It also prevents new pouches from forming. A high-fiber diet reduces the risk of colon cancer. It also lowers blood cholesterol and prevents high blood sugar in people with diabetes.    The fiber-rich foods listed below should be part of your diet. If you are not used to high-fiber foods, start with 1 or 2 foods from this list. Every 3 to 4 days add a new one to your diet. Do this until you are eating 4 high-fiber foods per day. This should give you 20 to 35 grams of fiber a day. It is also important to drink a lot of water when you are on this diet. You should have 6 to 8 glasses of water a day. Water makes the fiber swell and increases the benefit.  Foods high in dietary fiber  The following foods are high in dietary fiber:    Breads. Breads made with 100% whole-wheat flour; denise, wheat, or rye crackers; whole-grain tortillas, bran muffins.    Cereals. Whole-grain and bran cereals with bran (shredded wheat, wheat flakes, raisin bran, corn bran); oatmeal, rolled oats, granola, and brown rice.    Fruits. Fresh fruits and their edible skins (pears, prunes, raisins, berries, apples, and apricots); bananas, citrus fruit, mangoes, pineapple; and prune juice.    Nuts. Any nuts and seeds.    Vegetables. Best served raw or lightly cooked. All types, especially: green peas, celery, eggplant, potatoes, spinach, broccoli, [place] sprouts, winter squash, carrots, cauliflower, soybeans, lentils, and fresh and dried beans of all kinds.    Other. Popcorn, any spices.  Date Last Reviewed: 8/1/2016 2000-2017 [employer]. [address] [place] [address], [place] [address]. All rights reserved. This  information is not intended as a substitute for professional medical care. Always follow your healthcare professional's instructions.

## 2018-01-26 NOTE — LETTER
2/2/2018         [facility] Lennon   [address] [facility] [address]        Dear Ms. Lennon:    Gabby-Your labs look okay so far.                  Results for orders placed or performed in visit on 01/26/18   IgA [LAB73]   Result Value Ref Range     70 - 380 mg/dL   Celiac Disease Dual Antigen Screen [[id number]]   Result Value Ref Range    Celiac Disease Dual Ag Screen 10 0 - 19 Units   Deamidated Giladin Peptide Shyam IgA IgG [[id number]]   Result Value Ref Range    Deamidated Gliadin Shyam, IgA 1 <7 U/mL    Deamidated Gliadin Shyam, IgG <1 <7 U/mL   Tissue transglutaminase shyam IgA and IgG [[id number]]   Result Value Ref Range    Tissue Transglutaminase Antibody IgA 1 <7 U/mL    Tissue Transglutaminase Shyam IgG <1 <7 U/mL   Endomysial Antibody IgA by IFA [[id number]]   Result Value Ref Range    Endomysial Antibody IgA by IFA <1:10 <1:10   TSH   Result Value Ref Range    TSH 1.53 0.40 - 4.00 mU/L   T4 free   Result Value Ref Range    T4 Free 0.95 0.76 - 1.46 ng/dL   Comprehensive metabolic panel   Result Value Ref Range    Sodium 140 133 - 144 mmol/L    Potassium 4.0 3.4 - 5.3 mmol/L    Chloride 105 94 - 109 mmol/L    Carbon Dioxide 28 20 - 32 mmol/L    Anion Gap 7 3 - 14 mmol/L    Glucose 86 70 - 99 mg/dL    Urea Nitrogen 9 7 - 30 mg/dL    Creatinine 0.52 0.52 - 1.04 mg/dL    GFR Estimate >90 >60 mL/min/1.7m2    GFR Estimate If Black >90 >60 mL/min/1.7m2    Calcium 9.0 8.5 - 10.1 mg/dL    Bilirubin Total 0.4 0.2 - 1.3 mg/dL    Albumin 4.2 3.4 - 5.0 g/dL    Protein Total 8.0 6.8 - 8.8 g/dL    Alkaline Phosphatase 78 40 - 150 U/L    ALT 22 0 - 50 U/L    AST 13 0 - 45 U/L   CK total   Result Value Ref Range    CK Total 89 30 - 225 U/L   Anti Nuclear Shyam IgG by IFA with Reflex   Result Value Ref Range    KARLO interpretation Negative NEG^Negative   C-Reactive Protein, Inflammatory   Result Value Ref Range    CRP Inflammation <2.9 0.0 - 8.0 mg/L         Please note that test explanations are brief and do not  reflect all diagnostic uses.  If you have any questions or concerns, please call the clinic at [phone].      Sincerely,      Denise Taveras sent on behalf of  Milagro Ortega MD

## 2018-01-26 NOTE — LETTER
2/2/2018         [place] Lennon   [address] [place] [address]        Dear Ms. Lennon:    Sjia-All of your labs are okay    Results for orders placed or performed in visit on 01/26/18   IgA [LAB73]   Result Value Ref Range     70 - 380 mg/dL   Celiac Disease Dual Antigen Screen [[id number]]   Result Value Ref Range    Celiac Disease Dual Ag Screen 10 0 - 19 Units   Deamidated Giladin Peptide Shyam IgA IgG [[id number]]   Result Value Ref Range    Deamidated Gliadin Shyam, IgA 1 <7 U/mL    Deamidated Gliadin Shyam, IgG <1 <7 U/mL   Tissue transglutaminase shyam IgA and IgG [[id number]]   Result Value Ref Range    Tissue Transglutaminase Antibody IgA 1 <7 U/mL    Tissue Transglutaminase Shyam IgG <1 <7 U/mL   Endomysial Antibody IgA by IFA [[id number]]   Result Value Ref Range    Endomysial Antibody IgA by IFA <1:10 <1:10   TSH   Result Value Ref Range    TSH 1.53 0.40 - 4.00 mU/L   T4 free   Result Value Ref Range    T4 Free 0.95 0.76 - 1.46 ng/dL   Comprehensive metabolic panel   Result Value Ref Range    Sodium 140 133 - 144 mmol/L    Potassium 4.0 3.4 - 5.3 mmol/L    Chloride 105 94 - 109 mmol/L    Carbon Dioxide 28 20 - 32 mmol/L    Anion Gap 7 3 - 14 mmol/L    Glucose 86 70 - 99 mg/dL    Urea Nitrogen 9 7 - 30 mg/dL    Creatinine 0.52 0.52 - 1.04 mg/dL    GFR Estimate >90 >60 mL/min/1.7m2    GFR Estimate If Black >90 >60 mL/min/1.7m2    Calcium 9.0 8.5 - 10.1 mg/dL    Bilirubin Total 0.4 0.2 - 1.3 mg/dL    Albumin 4.2 3.4 - 5.0 g/dL    Protein Total 8.0 6.8 - 8.8 g/dL    Alkaline Phosphatase 78 40 - 150 U/L    ALT 22 0 - 50 U/L    AST 13 0 - 45 U/L   CK total   Result Value Ref Range    CK Total 89 30 - 225 U/L   Anti Nuclear Shyam IgG by IFA with Reflex   Result Value Ref Range    KARLO interpretation Negative NEG^Negative   C-Reactive Protein, Inflammatory   Result Value Ref Range    CRP Inflammation <2.9 0.0 - 8.0 mg/L         Please note that test explanations are brief and do not reflect all  diagnostic uses.  If you have any questions or concerns, please call the clinic at [phone].      Sincerely,      Denise Taveras sent on behalf of  Milagro Ortega MD

## 2018-01-26 NOTE — MR AVS SNAPSHOT
After Visit Summary   1/26/2018    Gabby Lennon    MRN: [id number]           Patient Information     Date Of Birth          1985        Visit Information        Provider Department      1/26/2018 11:30 AM Milagro Zavala MD [facility]         Today's Diagnoses     Diarrhea, unspecified type    -  1    Muscle pain          Care Instructions      High-Fiber Diet  Fiber is in fruits, vegetables, cereals, and grains. Fiber passes through your body undigested. A high-fiber diet helps food move through your intestinal tract. The added bulk is helpful in preventing constipation. In people with diverticulosis, fiber helps clean out the pouches along the colon wall. It also prevents new pouches from forming. A high-fiber diet reduces the risk of colon cancer. It also lowers blood cholesterol and prevents high blood sugar in people with diabetes.    The fiber-rich foods listed below should be part of your diet. If you are not used to high-fiber foods, start with 1 or 2 foods from this list. Every 3 to 4 days add a new one to your diet. Do this until you are eating 4 high-fiber foods per day. This should give you 20 to 35 grams of fiber a day. It is also important to drink a lot of water when you are on this diet. You should have 6 to 8 glasses of water a day. Water makes the fiber swell and increases the benefit.  Foods high in dietary fiber  The following foods are high in dietary fiber:    Breads. Breads made with 100% whole-wheat flour; denise, wheat, or rye crackers; whole-grain tortillas, bran muffins.    Cereals. Whole-grain and bran cereals with bran (shredded wheat, wheat flakes, raisin bran, corn bran); oatmeal, rolled oats, granola, and brown rice.    Fruits. Fresh fruits and their edible skins (pears, prunes, raisins, berries, apples, and apricots); bananas, citrus fruit, mangoes, pineapple; and prune juice.    Nuts. Any nuts and  seeds.    Vegetables. Best served raw or lightly cooked. All types, especially: green peas, celery, eggplant, potatoes, spinach, broccoli, [place] sprouts, winter squash, carrots, cauliflower, soybeans, lentils, and fresh and dried beans of all kinds.    Other. Popcorn, any spices.  Date Last Reviewed: 2016-2017 [employer]. [address] [place] [address]. All rights reserved. This information is not intended as a substitute for professional medical care. Always follow your healthcare professional's instructions.                Follow-ups after your visit        Who to contact     Please call your clinic at [phone] to:    Ask questions about your health    Make or cancel appointments    Discuss your medicines    Learn about your test results    Speak to your doctor   If you have compliments or concerns about an experience at your clinic, or if you wish to file a complaint, please contact [facility] Patient Relations at [phone] or email us at [email or web address]         Additional Information About Your Visit        BTIGharBizmore Information     Conduit is an electronic gateway that provides easy, online access to your medical records. With Conduit, you can request a clinic appointment, read your test results, renew a prescription or communicate with your care team.     To sign up for Conduit visit the website at [email or web address]   You will be asked to enter the access code listed below, as well as some personal information. Please follow the directions to create your username and password.     Your access code is: [email or web address]  Expires: 2018  6:31 AM     Your access code will  in 90 days. If you need help or a new code, please contact your [facility] or call [phone] for assistance.        Care EveryWhere ID     This is your Care EveryWhere ID. This could be used by other  organizations to access your [place] medical records  [id number]        Your Vitals Were     Last Period BMI (Body Mass Index)                12/10/2017 31.9 kg/m2           Blood Pressure from Last 3 Encounters:   01/26/18 101/68   12/27/17 106/63   09/28/17 111/72    Weight from Last 3 Encounters:   01/26/18 66.9 kg (147 lb 6.4 oz)   12/27/17 69.2 kg (152 lb 9.6 oz)   09/28/17 67.6 kg (149 lb)              We Performed the Following     Anti Nuclear Shyam IgG by IFA with Reflex     C-Reactive Protein, Inflammatory     Celiac Disease Dual Antigen Screen [GFZ7054]     CK total     Comprehensive metabolic panel     Deamidated Giladin Peptide Shyam IgA IgG [ZCP7030]     Endomysial Antibody IgA by IFA [YOG3384]     IgA [LAB73]     T4 free     Tissue transglutaminase shyam IgA and IgG [YXH7447]     TSH        Primary Care Provider Office Phone # Fax #    Marta Cal Ross [phone]       [address] [address]        Equal Access to Services     PAVITHRA CASTILLO : Hadii aad ku hadasho Somary, waaxda luqadaha, qaybta kaalmada susan, omari mcdaniels . So [facility] [phone].    ATENCIÓN: Si habla español, tiene a diallo disposición servicios gratuitos de asistencia lingüística. [facility] [phone].    We comply with applicable federal civil rights laws and [place] laws. We do not discriminate on the basis of race, color, national origin, age, disability, sex, sexual orientation, or gender identity.            Thank you!     Thank you for choosing [facility]   for your care. Our goal is always to provide you with excellent care. Hearing back from our patients is one way we can continue to improve our services. Please take a few minutes to complete the written survey that you may receive in the mail after your visit with us. Thank you!             Your Updated Medication List - Protect others around you: Learn how to safely use, store  and throw away your medicines at www.disposemymeds.org.          This list is accurate as of 1/26/18 12:25 PM.  Always use your most recent med list.                   Brand Name Dispense Instructions for use Diagnosis    paragard intrauterine copper      1 each by Intrauterine route once Inserted 12-6-2016        prenatal multivitamin plus iron 27-0.8 MG Tabs per tablet     100 tablet    Take 1 tablet by mouth daily    Routine postpartum follow-up       sertraline 100 MG tablet    ZOLOFT    90 tablet    Take 1 tablet (100 mg) by mouth daily    Mixed emotional features as adjustment reaction
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
## 2018-01-29 LAB
ANA SER QL IF: NEGATIVE
ENDOMYSIUM IGA TITR SER IF: NORMAL {TITER}
GLIADIN IGA SER-ACNC: 1 U/ML
GLIADIN IGG SER-ACNC: <1 U/ML
GLIADIN PEPTIDE+TTG IGA+IGG SER QL IA: 10 UNITS (ref 0–19)
IGA SERPL-MCNC: 318 MG/DL (ref 70–380)
TTG IGA SER-ACNC: 1 U/ML
TTG IGG SER-ACNC: <1 U/ML

## 2018-02-22 ENCOUNTER — OFFICE VISIT (OUTPATIENT)
Dept: OTHER | Facility: CLINIC | Age: 33
End: 2018-02-22
Payer: COMMERCIAL

## 2018-02-22 DIAGNOSIS — M54.50 BILATERAL LOW BACK PAIN WITHOUT SCIATICA, UNSPECIFIED CHRONICITY: ICD-10-CM

## 2018-02-22 DIAGNOSIS — M54.2 NECK PAIN: Primary | ICD-10-CM

## 2018-02-22 NOTE — MR AVS SNAPSHOT
After Visit Summary   2018    Gabby Lennon    MRN: [id number]           Patient Information     Date Of Birth          1985        Visit Information        Provider Department      2018 2:00 PM Brittany Boone [facility] Acupuncture        Today's Diagnoses     Neck pain    -  1    Bilateral low back pain without sciatica, unspecified chronicity           Follow-ups after your visit        Follow-up notes from your care team     Return in about 2 weeks (around 3/8/2018) for Acupuncture.      Who to contact     Please call your clinic at [phone] to:    Ask questions about your health    Make or cancel appointments    Discuss your medicines    Learn about your test results    Speak to your doctor            Additional Information About Your Visit        MyChart Information     Naubot is an electronic gateway that provides easy, online access to your medical records. With Hydrophi, you can request a clinic appointment, read your test results, renew a prescription or communicate with your care team.     To sign up for Naubot visit the website at [email or web address]   You will be asked to enter the access code listed below, as well as some personal information. Please follow the directions to create your username and password.     Your access code is: [email or web address]  Expires: 2018  6:31 AM     Your access code will  in 90 days. If you need help or a new code, please contact your [facility] or call [phone] for assistance.        Care EveryWhere ID     This is your Care EveryWhere ID. This could be used by other organizations to access your [place] medical records  [id number]         Blood Pressure from Last 3 Encounters:   18 101/68   17 106/63   17 111/72    Weight from Last 3 Encounters:   18 147 lb 6.4 oz (66.9 kg)   17 152 lb 9.6 oz (69.2 kg)   17 149 lb (67.6 kg)              We Performed  the Following     ACUPUNCT W/O ELEC STIMUL ADDL 15M     ACUPUNCTURE, 1+ NEEDLES, W/O ELECTRICAL STIM; INIT 15 MIN PERSONAL CONTACT        Primary Care Provider Office Phone # Fax #    Marta Cal Ross [phone]       [address] [facility] [address]  [facility] [address]        Equal Access to Services     [facility]: Hadii aad ku hadasho Soomaali, waaxda luqadaha, qaybta kaalmada adeegyada, waxay idiin hayaan adeeg khfarrahsh lastan . So [facility] [phone].    ATENCIÓN: Si habla español, tiene a diallo disposición servicios gratuitos de asistencia lingüística. Joao al [phone].    We comply with applicable federal civil rights laws and [place] laws. We do not discriminate on the basis of race, color, national origin, age, disability, sex, sexual orientation, or gender identity.            Thank you!     Thank you for choosing [facility] ACUPUNCTURE  for your care. Our goal is always to provide you with excellent care. Hearing back from our patients is one way we can continue to improve our services. Please take a few minutes to complete the written survey that you may receive in the mail after your visit with us. Thank you!             Your Updated Medication List - Protect others around you: Learn how to safely use, store and throw away your medicines at www.disposemymeds.org.          This list is accurate as of 2/22/18  4:44 PM.  Always use your most recent med list.                   Brand Name Dispense Instructions for use Diagnosis    paragard intrauterine copper      1 each by Intrauterine route once Inserted 12-6-2016        prenatal multivitamin plus iron 27-0.8 MG Tabs per tablet     100 tablet    Take 1 tablet by mouth daily    Routine postpartum follow-up       sertraline 100 MG tablet    ZOLOFT    90 tablet    Take 1 tablet (100 mg) by mouth daily    Mixed emotional features as adjustment reaction
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
## 2018-06-17 ENCOUNTER — HEALTH MAINTENANCE LETTER (OUTPATIENT)
Age: 33
End: 2018-06-17

## 2018-11-06 DIAGNOSIS — F43.29 MIXED EMOTIONAL FEATURES AS ADJUSTMENT REACTION: ICD-10-CM

## 2018-11-06 RX ORDER — SERTRALINE HYDROCHLORIDE 100 MG/1
100 TABLET, FILM COATED ORAL DAILY
Qty: 30 TABLET | Refills: 0 | Status: SHIPPED | OUTPATIENT
Start: 2018-11-06 | End: 2019-01-03

## 2018-11-06 NOTE — TELEPHONE ENCOUNTER
Received refill request for zoloft. Has clinic appointment scheduled for beginning of December. One month refill sent.

## 2018-12-06 ENCOUNTER — OFFICE VISIT (OUTPATIENT)
Dept: OBGYN | Facility: CLINIC | Age: 33
End: 2018-12-06
Attending: OBSTETRICS & GYNECOLOGY
Payer: COMMERCIAL

## 2018-12-06 VITALS
DIASTOLIC BLOOD PRESSURE: 57 MMHG | HEIGHT: 57 IN | HEART RATE: 73 BPM | BODY MASS INDEX: 31.9 KG/M2 | SYSTOLIC BLOOD PRESSURE: 110 MMHG

## 2018-12-06 DIAGNOSIS — Z12.4 SCREENING FOR MALIGNANT NEOPLASM OF CERVIX: ICD-10-CM

## 2018-12-06 DIAGNOSIS — Z30.432 ENCOUNTER FOR REMOVAL OF INTRAUTERINE CONTRACEPTIVE DEVICE: ICD-10-CM

## 2018-12-06 DIAGNOSIS — Z00.00 PREVENTATIVE HEALTH CARE: Primary | ICD-10-CM

## 2018-12-06 DIAGNOSIS — Z11.3 ROUTINE SCREENING FOR STI (SEXUALLY TRANSMITTED INFECTION): ICD-10-CM

## 2018-12-06 PROCEDURE — G0145 SCR C/V CYTO,THINLAYER,RESCR: HCPCS | Performed by: STUDENT IN AN ORGANIZED HEALTH CARE EDUCATION/TRAINING PROGRAM

## 2018-12-06 PROCEDURE — 36415 COLL VENOUS BLD VENIPUNCTURE: CPT | Performed by: STUDENT IN AN ORGANIZED HEALTH CARE EDUCATION/TRAINING PROGRAM

## 2018-12-06 PROCEDURE — 87624 HPV HI-RISK TYP POOLED RSLT: CPT | Performed by: STUDENT IN AN ORGANIZED HEALTH CARE EDUCATION/TRAINING PROGRAM

## 2018-12-06 PROCEDURE — 87389 HIV-1 AG W/HIV-1&-2 AB AG IA: CPT | Performed by: STUDENT IN AN ORGANIZED HEALTH CARE EDUCATION/TRAINING PROGRAM

## 2018-12-06 PROCEDURE — 87491 CHLMYD TRACH DNA AMP PROBE: CPT | Performed by: STUDENT IN AN ORGANIZED HEALTH CARE EDUCATION/TRAINING PROGRAM

## 2018-12-06 PROCEDURE — 87591 N.GONORRHOEAE DNA AMP PROB: CPT | Performed by: STUDENT IN AN ORGANIZED HEALTH CARE EDUCATION/TRAINING PROGRAM

## 2018-12-06 PROCEDURE — 86780 TREPONEMA PALLIDUM: CPT | Performed by: STUDENT IN AN ORGANIZED HEALTH CARE EDUCATION/TRAINING PROGRAM
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
## 2018-12-07 LAB
C TRACH DNA SPEC QL NAA+PROBE: NEGATIVE
HIV 1+2 AB+HIV1 P24 AG SERPL QL IA: NONREACTIVE
N GONORRHOEA DNA SPEC QL NAA+PROBE: NEGATIVE
SPECIMEN SOURCE: NORMAL
SPECIMEN SOURCE: NORMAL
T PALLIDUM AB SER QL: NONREACTIVE

## 2018-12-11 LAB
COPATH REPORT: NORMAL
PAP: NORMAL

## 2018-12-12 LAB
FINAL DIAGNOSIS: NORMAL
HPV HR 12 DNA CVX QL NAA+PROBE: NEGATIVE
HPV16 DNA SPEC QL NAA+PROBE: NEGATIVE
HPV18 DNA SPEC QL NAA+PROBE: NEGATIVE
SPECIMEN DESCRIPTION: NORMAL
SPECIMEN SOURCE CVX/VAG CYTO: NORMAL

## 2019-01-03 DIAGNOSIS — F43.29 MIXED EMOTIONAL FEATURES AS ADJUSTMENT REACTION: ICD-10-CM

## 2019-01-03 RX ORDER — SERTRALINE HYDROCHLORIDE 100 MG/1
100 TABLET, FILM COATED ORAL DAILY
Qty: 90 TABLET | Refills: 3 | Status: SHIPPED | OUTPATIENT
Start: 2019-01-03 | End: 2020-11-27

## 2019-01-03 NOTE — TELEPHONE ENCOUNTER
Received refill request for zoloft. Patient had recent annual where this was discussed and told to continue taking. Rx sent.

## 2020-02-16 ENCOUNTER — HEALTH MAINTENANCE LETTER (OUTPATIENT)
Age: 35
End: 2020-02-16

## 2020-11-22 ENCOUNTER — HEALTH MAINTENANCE LETTER (OUTPATIENT)
Age: 35
End: 2020-11-22

## 2020-11-27 ENCOUNTER — MYC REFILL (OUTPATIENT)
Dept: OBGYN | Facility: CLINIC | Age: 35
End: 2020-11-27

## 2020-11-27 DIAGNOSIS — F43.29 MIXED EMOTIONAL FEATURES AS ADJUSTMENT REACTION: ICD-10-CM

## 2020-11-27 NOTE — TELEPHONE ENCOUNTER
REFERRAL INFORMATION:    Referring Provider:  Self Referral     Referring Clinic:  N/A    Reason for Visit/Diagnosis: Gallstones        FUTURE VISIT INFORMATION:    Appointment Date: 12/2/2020    Appointment Time: 8 AM      NOTES RECORD STATUS  DETAILS   OFFICE NOTE from Referring Provider N/A    OFFICE NOTE from Other Specialists Care Everywhere 1/3/11 Office visit with Dr. Darlene Willoughby ( [facility])     12/14/10 Office visit with Dr. Chico Flores ([facility])     10/20/10 Office visit with Dr. Josue Guillory ([facility])    HOSPITAL DISCHARGE SUMMARY/ ED VISITS  N/A    OPERATIVE REPORT N/A    ENDOSCOPY (EGD)  N/A    PERTINENT LABS Internal/ Care Everywhere    PATHOLOGY REPORTS (RELATED) N/A    IMAGING (CT, MRI, US, XR)  N/A

## 2020-11-30 RX ORDER — SERTRALINE HYDROCHLORIDE 100 MG/1
100 TABLET, FILM COATED ORAL DAILY
Qty: 90 TABLET | Refills: 0 | Status: SHIPPED | OUTPATIENT
Start: 2020-11-30 | End: 2021-09-14

## 2020-11-30 RX ORDER — PRENATAL VIT/IRON FUM/FOLIC AC 27MG-0.8MG
1 TABLET ORAL DAILY
Qty: 100 TABLET | Refills: 3 | Status: SHIPPED | OUTPATIENT
Start: 2020-11-30 | End: 2021-12-01

## 2020-11-30 NOTE — TELEPHONE ENCOUNTER
Received refill request for zoloft. Patient last seen in 2018. 3 month refill sent and message to patient to call and schedule annual.

## 2020-12-01 ENCOUNTER — PATIENT OUTREACH (OUTPATIENT)
Dept: SURGERY | Facility: CLINIC | Age: 35
End: 2020-12-01

## 2020-12-01 NOTE — PROGRESS NOTES
Pre Visit Call and Assessment    Date of call:  12/01/2020    Phone numbers:  Cell number on file:    Telephone Information:   Mobile [phone]       Reached patient/confirmed appointment:  Yes  Patient care team/Primary provider:  Marta Ross  Preferred outpatient pharmacy:    [facility] [place] [facility] [address] [facility]  [address] [facility] [address]  Phone: [phone] Fax: [phone]    [facility] [place] [facility] [address]  [address] [facility] [address]  Phone: [phone] Fax: [phone]    Referred to:  Dr. Pierre Garza    Reason for visit:  Gallbladder consult

## 2020-12-02 ENCOUNTER — PRE VISIT (OUTPATIENT)
Dept: SURGERY | Facility: CLINIC | Age: 35
End: 2020-12-02

## 2020-12-02 ENCOUNTER — VIRTUAL VISIT (OUTPATIENT)
Dept: SURGERY | Facility: CLINIC | Age: 35
End: 2020-12-02
Payer: COMMERCIAL

## 2020-12-02 VITALS — BODY MASS INDEX: 28.22 KG/M2 | WEIGHT: 140 LBS | HEIGHT: 59 IN

## 2020-12-02 DIAGNOSIS — K80.20 CALCULUS OF GALLBLADDER WITHOUT CHOLECYSTITIS WITHOUT OBSTRUCTION: Primary | ICD-10-CM

## 2020-12-02 PROCEDURE — 99202 OFFICE O/P NEW SF 15 MIN: CPT | Mod: 95 | Performed by: SURGERY

## 2020-12-02 ASSESSMENT — MIFFLIN-ST. JEOR: SCORE: 1230.67

## 2020-12-02 ASSESSMENT — PAIN SCALES - GENERAL: PAINLEVEL: SEVERE PAIN (7)

## 2020-12-02 NOTE — LETTER
"12/2/2020       RE: Gabby Lennon  [address] [facility] [address]     Dear Colleague,    Thank you for referring your patient, Gabby Lennon, to the [facility] at [facility]. Please see a copy of my visit note below.    Gabby Lennon is a 35 year old female who is being evaluated via a billable video visit.      The patient has been notified of following:     \"This video visit will be conducted via a call between you and your physician/provider. We have found that certain health care needs can be provided without the need for an in-person physical exam.  This service lets us provide the care you need with a video conversation.  If a prescription is necessary we can send it directly to your pharmacy.  If lab work is needed we can place an order for that and you can then stop by our lab to have the test done at a later time.    Video visits are billed at different rates depending on your insurance coverage.  Please reach out to your insurance provider with any questions.    If during the course of the call the physician/provider feels a video visit is not appropriate, you will not be charged for this service.\"    Patient has given verbal consent for Video visit? Yes  How would you like to obtain your AVS? MyChart  If you are dropped from the video visit, the video invite should be resent to: Send to e-mail at: [email or web address]  Will anyone else be joining your video visit? No          Video-Visit Details    Type of service:  Video Visit    Video Start Time: 8:36 AM  Video End Time: 9:00 AM    Originating Location (pt. Location): Home    Distant Location (provider location):  [facility]   Platform used for Video Visit: Bert      Has pain intermittently.  Was hurting yesterday all day.  Estimates it happens 4x/week.  Pain lasts about 10 minutes or so.  Drinks tea which " helps with the pain, lays on her left side.  Feels like a stabbing pain, after eating something greasy or meat it comes in about 20 minutes.  No jaundice or scleral icterus.  She is able to control the pain with diet modifications for the most part.    PSH: Csection, hand surgery  PMH: none  Meds:   Current Outpatient Medications   Medication     Prenatal Vit-Fe Fumarate-FA (PRENATAL MULTIVITAMIN W/IRON) 27-0.8 MG tablet     sertraline (ZOLOFT) 100 MG tablet     paragard intrauterine copper     No current facility-administered medications for this visit.      All: NKDA   FamHx: noncontributory  Social: no smoking, occasional etoh, no illicits     We discussed her symptoms.  I recommended cholecystectomy for what sounds like worsening symptomatic cholelithiasis.  She is appropriately hesitant about surgery.   We discussed the risks/benefits/alternatives.  She would like to think about surgery for the moment and continue her diet restrictions.  I will repeat her US to evaluate for cholecystitis.  I do not think she needs labwork.    -US of gallbladder  -Call with results and to rediscuss scheduling for surgery  -Return precautions given    I spent 24 minutes with Gabby Lennon, over half of which was spent on counseling    Pierre Garza MD

## 2020-12-02 NOTE — PROGRESS NOTES
"Gabby Lennon is a 35 year old female who is being evaluated via a billable video visit.      The patient has been notified of following:     \"This video visit will be conducted via a call between you and your physician/provider. We have found that certain health care needs can be provided without the need for an in-person physical exam.  This service lets us provide the care you need with a video conversation.  If a prescription is necessary we can send it directly to your pharmacy.  If lab work is needed we can place an order for that and you can then stop by our lab to have the test done at a later time.    Video visits are billed at different rates depending on your insurance coverage.  Please reach out to your insurance provider with any questions.    If during the course of the call the physician/provider feels a video visit is not appropriate, you will not be charged for this service.\"    Patient has given verbal consent for Video visit? Yes  How would you like to obtain your AVS? MyChart  If you are dropped from the video visit, the video invite should be resent to: Send to e-mail at: [email or web address]  Will anyone else be joining your video visit? No        Video-Visit Details    Type of service:  Video Visit    Video Start Time: 8:36 AM  Video End Time: 9:00 AM    Originating Location (pt. Location): Home    Distant Location (provider location):  [facility] [place]   Platform used for Video Visit: Bert      Has pain intermittently.  Was hurting yesterday all day.  Estimates it happens 4x/week.  Pain lasts about 10 minutes or so.  Drinks tea which helps with the pain, lays on her left side.  Feels like a stabbing pain, after eating something greasy or meat it comes in about 20 minutes.  No jaundice or scleral icterus.  She is able to control the pain with diet modifications for the most part.    PSH: Csection, hand surgery  PMH: none  Meds:   Current Outpatient " Medications   Medication     Prenatal Vit-Fe Fumarate-FA (PRENATAL MULTIVITAMIN W/IRON) 27-0.8 MG tablet     sertraline (ZOLOFT) 100 MG tablet     paragard intrauterine copper     No current facility-administered medications for this visit.      All: NKDA   FamHx: noncontributory  Social: no smoking, occasional etoh, no illicits       We discussed her symptoms.  I recommended cholecystectomy for what sounds like worsening symptomatic cholelithiasis.  She is appropriately hesitant about surgery.   We discussed the risks/benefits/alternatives.  She would like to think about surgery for the moment and continue her diet restrictions.  I will repeat her US to evaluate for cholecystitis.  I do not think she needs labwork.    -US of gallbladder  -Call with results and to rediscuss scheduling for surgery  -Return precautions given    I spent 24 minutes with Gabby Lennon, over half of which was spent on counseling      Pierre Garza MD

## 2020-12-02 NOTE — NURSING NOTE
"Chief Complaint   Patient presents with     Consult     New appt for gallstones.       Vitals:    12/02/20 0810   Weight: 63.5 kg (140 lb)   Height: 1.499 m (4' 11\")       Body mass index is 28.28 kg/m .                            JOSS HEDRICK, EMT    "

## 2020-12-03 ENCOUNTER — ANCILLARY PROCEDURE (OUTPATIENT)
Dept: ULTRASOUND IMAGING | Facility: CLINIC | Age: 35
End: 2020-12-03
Attending: SURGERY
Payer: COMMERCIAL

## 2020-12-03 DIAGNOSIS — K80.20 CALCULUS OF GALLBLADDER WITHOUT CHOLECYSTITIS WITHOUT OBSTRUCTION: ICD-10-CM

## 2020-12-03 PROCEDURE — 76705 ECHO EXAM OF ABDOMEN: CPT | Mod: GC | Performed by: RADIOLOGY

## 2020-12-08 ENCOUNTER — NURSE TRIAGE (OUTPATIENT)
Dept: NURSING | Facility: CLINIC | Age: 35
End: 2020-12-08

## 2020-12-08 NOTE — TELEPHONE ENCOUNTER
Patient is calling for results of ultrasound from 12/3/2020. Results have not yet been reviewed.    Please call patient with results at [phone].    Protocol and care advice reviewed  Caller states understanding of the recommended disposition    Advised to call back if further questions or concerns      Reason for Disposition    Caller requesting lab results    Additional Information    Negative: ED call to PCP    Negative: Physician call to PCP    Negative: Call about patient who is currently hospitalized    Negative: Lab or radiology calling with CRITICAL test results    Negative: [1] Prescription not at pharmacy AND [2] was prescribed today by PCP    Negative: [1] Follow-up call from patient regarding patient's clinical status AND [2] information urgent    Negative: [1] Caller requests to speak ONLY to PCP AND [2] urgent question    Negative: [1] Caller requests to speak to PCP now AND [2] won't tell us reason for call  (Exception: if 10 pm to 6 am, caller must first discuss reason for the call)    Negative: Notification of hospital admission    Negative: Notification of death    Protocols used: PCP CALL - NO TRIAGE-A-

## 2020-12-16 ENCOUNTER — PREP FOR PROCEDURE (OUTPATIENT)
Dept: SURGERY | Facility: CLINIC | Age: 35
End: 2020-12-16

## 2020-12-16 DIAGNOSIS — K80.20 CALCULUS OF GALLBLADDER WITHOUT CHOLECYSTITIS WITHOUT OBSTRUCTION: Primary | ICD-10-CM

## 2020-12-16 RX ORDER — CLINDAMYCIN PHOSPHATE 900 MG/50ML
900 INJECTION, SOLUTION INTRAVENOUS
Status: CANCELLED | OUTPATIENT
Start: 2020-12-16

## 2020-12-16 RX ORDER — CLINDAMYCIN PHOSPHATE 900 MG/50ML
900 INJECTION, SOLUTION INTRAVENOUS SEE ADMIN INSTRUCTIONS
Status: CANCELLED | OUTPATIENT
Start: 2020-12-16

## 2020-12-21 ENCOUNTER — TELEPHONE (OUTPATIENT)
Dept: SURGERY | Facility: CLINIC | Age: 35
End: 2020-12-21

## 2020-12-21 NOTE — TELEPHONE ENCOUNTER
BANG to schedule surgery with Dr. Garza. No reply on MyChart yet so called to discuss.     Provided direct contact info.    Additional Instructions for the Case    Is this a multi-surgeon case?  No  Urgency of surgery:  Patient choice/next available  Location:  [facility] [place]- 5th Floor  Surgeon procedure time in minutes:  90  Length of stay:  N/A  Laterality:  na  Type of anesthesia:  General  H&P to be completed by:  Dr. Garza will do the day of surgery- so don't schedule as first case.   needed:  No  Post op appointment:  Protocol  Additional Comments:

## 2021-02-01 ENCOUNTER — TELEPHONE (OUTPATIENT)
Dept: SURGERY | Facility: CLINIC | Age: 36
End: 2021-02-01

## 2021-02-01 NOTE — TELEPHONE ENCOUNTER
BANG to schedule surgery with Dr. Garza.    Provided direct number and requested that she leave a detailed voicemail as I am working from home.    Also noted that she may reply via [employer].

## 2021-02-18 ENCOUNTER — TELEPHONE (OUTPATIENT)
Dept: SURGERY | Facility: CLINIC | Age: 36
End: 2021-02-18

## 2021-02-18 NOTE — TELEPHONE ENCOUNTER
LVM #3 to schedule surgery with Dr. Garza.    Noted that I did send a [employer] message as well.    Provided my direct line to call for scheduling.     After 3rd attempt with no response, will wait for patient to call us to schedule surgery.

## 2021-04-04 ENCOUNTER — HEALTH MAINTENANCE LETTER (OUTPATIENT)
Age: 36
End: 2021-04-04

## 2021-09-08 DIAGNOSIS — F43.29 MIXED EMOTIONAL FEATURES AS ADJUSTMENT REACTION: ICD-10-CM

## 2021-09-08 NOTE — TELEPHONE ENCOUNTER
Received refill request for sertraline. Patient last seen in 2018. She was sent a 90-day supply of medication in November 2020. No appointments made. Declined refill request and faxed back to pharmacy. Patient needs visit for further refills.

## 2021-09-13 NOTE — TELEPHONE ENCOUNTER
Patient scheduled physical on 1104/21.  Are you able to fill prescription till then?    Please advise    Adry Irving

## 2021-09-14 RX ORDER — SERTRALINE HYDROCHLORIDE 100 MG/1
100 TABLET, FILM COATED ORAL DAILY
Qty: 90 TABLET | Refills: 0 | Status: SHIPPED | OUTPATIENT
Start: 2021-09-14 | End: 2022-04-11

## 2021-09-19 ENCOUNTER — HEALTH MAINTENANCE LETTER (OUTPATIENT)
Age: 36
End: 2021-09-19

## 2021-10-22 ENCOUNTER — TELEPHONE (OUTPATIENT)
Dept: ORTHOPEDICS | Facility: CLINIC | Age: 36
End: 2021-10-22

## 2021-10-22 NOTE — TELEPHONE ENCOUNTER
"Received voicemail from patient stating that she had a gallbladder consult with Dr. Garza.    She would like to schedule surgery. Stated \"I am trying to decide which surgeon to go with based on who can get me in the soonest\".    Chart review: I had contacted this patient multiple times to schedule surgery with Dr. Garza in February of 2021. Consult took place December 2020. Patient did not reach out until now.     Plan: Routing to RNCCs and surgical coordinator as I am not the coordinator with this team. Will request that they follow up.   "

## 2021-10-25 NOTE — TELEPHONE ENCOUNTER
Patient left another voicemail to schedule surgery with Dr. Garza as soon as she can.    Will route to General Surgery team and request follow up ASAP.

## 2021-10-26 ENCOUNTER — TELEPHONE (OUTPATIENT)
Dept: SURGERY | Facility: CLINIC | Age: 36
End: 2021-10-26

## 2021-10-26 NOTE — TELEPHONE ENCOUNTER
Attempted to contact patient in order to schedule surgery, no answer. Left voice mail and [employer] message sent instructing patient to return call to [phone] to schedule.

## 2021-12-01 ENCOUNTER — OFFICE VISIT (OUTPATIENT)
Dept: OBGYN | Facility: CLINIC | Age: 36
End: 2021-12-01
Attending: REGISTERED NURSE
Payer: COMMERCIAL

## 2021-12-01 VITALS
HEIGHT: 59 IN | DIASTOLIC BLOOD PRESSURE: 64 MMHG | HEART RATE: 73 BPM | BODY MASS INDEX: 25.95 KG/M2 | SYSTOLIC BLOOD PRESSURE: 97 MMHG | WEIGHT: 128.7 LBS

## 2021-12-01 DIAGNOSIS — Z01.818 PREOPERATIVE EXAMINATION: Primary | ICD-10-CM

## 2021-12-01 LAB
APTT PPP: 29 SECONDS (ref 22–38)
ERYTHROCYTE [DISTWIDTH] IN BLOOD BY AUTOMATED COUNT: 12.4 % (ref 10–15)
HCT VFR BLD AUTO: 35.1 % (ref 35–47)
HGB BLD-MCNC: 11.3 G/DL (ref 11.7–15.7)
INR PPP: 0.93 (ref 0.86–1.14)
MCH RBC QN AUTO: 31 PG (ref 26.5–33)
MCHC RBC AUTO-ENTMCNC: 32.2 G/DL (ref 31.5–36.5)
MCV RBC AUTO: 96 FL (ref 78–100)
PLATELET # BLD AUTO: 221 10E3/UL (ref 150–450)
RBC # BLD AUTO: 3.64 10E6/UL (ref 3.8–5.2)
WBC # BLD AUTO: 5.6 10E3/UL (ref 4–11)

## 2021-12-01 PROCEDURE — 99213 OFFICE O/P EST LOW 20 MIN: CPT | Performed by: REGISTERED NURSE

## 2021-12-01 PROCEDURE — 85027 COMPLETE CBC AUTOMATED: CPT | Performed by: REGISTERED NURSE

## 2021-12-01 PROCEDURE — 85610 PROTHROMBIN TIME: CPT | Performed by: REGISTERED NURSE

## 2021-12-01 PROCEDURE — G0463 HOSPITAL OUTPT CLINIC VISIT: HCPCS

## 2021-12-01 PROCEDURE — 85730 THROMBOPLASTIN TIME PARTIAL: CPT | Performed by: REGISTERED NURSE

## 2021-12-01 PROCEDURE — 36415 COLL VENOUS BLD VENIPUNCTURE: CPT | Performed by: REGISTERED NURSE

## 2021-12-01 ASSESSMENT — MIFFLIN-ST. JEOR: SCORE: 1175.28

## 2021-12-01 ASSESSMENT — PAIN SCALES - GENERAL: PAINLEVEL: EXTREME PAIN (8)
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
## 2021-12-01 NOTE — NURSING NOTE
Patient is here for pre op exam  Having cholectomy 12-10-21  DR. Donya Cleary  Fax  Pre op for [phone]

## 2022-04-11 ENCOUNTER — MYC REFILL (OUTPATIENT)
Dept: OBGYN | Facility: CLINIC | Age: 37
End: 2022-04-11
Payer: COMMERCIAL

## 2022-04-11 DIAGNOSIS — F43.29 MIXED EMOTIONAL FEATURES AS ADJUSTMENT REACTION: ICD-10-CM

## 2022-04-11 RX ORDER — SERTRALINE HYDROCHLORIDE 100 MG/1
100 TABLET, FILM COATED ORAL DAILY
Qty: 90 TABLET | Refills: 0 | Status: SHIPPED | OUTPATIENT
Start: 2022-04-11 | End: 2022-05-10

## 2022-05-01 ENCOUNTER — HEALTH MAINTENANCE LETTER (OUTPATIENT)
Age: 37
End: 2022-05-01

## 2022-05-10 ENCOUNTER — OFFICE VISIT (OUTPATIENT)
Dept: OBGYN | Facility: CLINIC | Age: 37
End: 2022-05-10
Attending: OBSTETRICS & GYNECOLOGY
Payer: COMMERCIAL

## 2022-05-10 VITALS
BODY MASS INDEX: 25.8 KG/M2 | DIASTOLIC BLOOD PRESSURE: 82 MMHG | HEART RATE: 74 BPM | HEIGHT: 59 IN | WEIGHT: 128 LBS | SYSTOLIC BLOOD PRESSURE: 119 MMHG

## 2022-05-10 DIAGNOSIS — F43.29 MIXED EMOTIONAL FEATURES AS ADJUSTMENT REACTION: ICD-10-CM

## 2022-05-10 DIAGNOSIS — Z00.00 ENCOUNTER FOR PREVENTIVE CARE: Primary | ICD-10-CM

## 2022-05-10 DIAGNOSIS — E55.9 VITAMIN D DEFICIENCY: ICD-10-CM

## 2022-05-10 PROCEDURE — 99395 PREV VISIT EST AGE 18-39: CPT | Performed by: OBSTETRICS & GYNECOLOGY

## 2022-05-10 RX ORDER — SERTRALINE HYDROCHLORIDE 100 MG/1
50 TABLET, FILM COATED ORAL DAILY
Qty: 90 TABLET | Refills: 3 | Status: SHIPPED | OUTPATIENT
Start: 2022-05-10 | End: 2022-05-10

## 2022-05-10 NOTE — LETTER
Date:May 11, 2022      Patient was self referred, no letter generated. Do not send.        [facility] Information
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
## 2022-05-10 NOTE — PATIENT INSTRUCTIONS
Dermatology Referral:  [facility]: [phone]  [facility] ([place]) [phone]  Dr. Cordero: [phone] ([facility])  [facility] dermatology: [phone]  [facility] dermatology/[facility]: [phone]   [facility] ([place]) [phone]  [phone]  [facility] [phone]    Follow up with Surgery for gallbladder concerns

## 2022-11-20 ENCOUNTER — HEALTH MAINTENANCE LETTER (OUTPATIENT)
Age: 37
End: 2022-11-20

## 2023-03-20 NOTE — NURSING NOTE
Chief Complaint   Patient presents with     Annual Visit   iud check paragard  was inserted- having pain with her iud   Pain comes and goes   And some spotting-  Also low back pain     Pain comes and goes   Pain for the last 4 years   
20-Mar-2023 16:00

## 2023-05-18 ENCOUNTER — TELEPHONE (OUTPATIENT)
Dept: OBGYN | Facility: CLINIC | Age: 38
End: 2023-05-18

## 2023-05-18 NOTE — TELEPHONE ENCOUNTER
----- Message from CA Enriquez CNM sent at 5/17/2023  6:07 PM CDT -----  Regarding: Incorrect scheduling  Hi!    It looks like this patient is in a 20 minute slot but needs a 40 minute for a physical. Can you move her on my schedule to a 40 min opening or another day? Thank you! CA Greene CNM

## 2023-05-18 NOTE — TELEPHONE ENCOUNTER
Called Gabby and explained she is in a 20 minute slot for an annual visit.  She stated she wants a visit with an MD instead of a CNM anyways.  I rescheduled her for 5/22 with Dr. Garcia in a 30 minute slot.
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
## 2023-06-06 ENCOUNTER — LAB (OUTPATIENT)
Dept: LAB | Facility: CLINIC | Age: 38
End: 2023-06-06
Attending: NURSE PRACTITIONER
Payer: COMMERCIAL

## 2023-06-06 ENCOUNTER — OFFICE VISIT (OUTPATIENT)
Dept: OBGYN | Facility: CLINIC | Age: 38
End: 2023-06-06
Attending: NURSE PRACTITIONER
Payer: COMMERCIAL

## 2023-06-06 VITALS
HEIGHT: 59 IN | SYSTOLIC BLOOD PRESSURE: 94 MMHG | BODY MASS INDEX: 30.28 KG/M2 | WEIGHT: 150.2 LBS | HEART RATE: 73 BPM | DIASTOLIC BLOOD PRESSURE: 64 MMHG

## 2023-06-06 DIAGNOSIS — Z11.3 SCREEN FOR STD (SEXUALLY TRANSMITTED DISEASE): ICD-10-CM

## 2023-06-06 DIAGNOSIS — Z12.4 SCREENING FOR CERVICAL CANCER: ICD-10-CM

## 2023-06-06 DIAGNOSIS — R53.82 CHRONIC FATIGUE: ICD-10-CM

## 2023-06-06 DIAGNOSIS — Z13.29 SCREENING FOR THYROID DISORDER: ICD-10-CM

## 2023-06-06 DIAGNOSIS — R10.2 PELVIC PAIN IN FEMALE: ICD-10-CM

## 2023-06-06 DIAGNOSIS — N94.6 DYSMENORRHEA: ICD-10-CM

## 2023-06-06 DIAGNOSIS — F41.9 ANXIETY: ICD-10-CM

## 2023-06-06 DIAGNOSIS — Z84.81 FAMILY HISTORY OF GENETIC DISEASE CARRIER: ICD-10-CM

## 2023-06-06 DIAGNOSIS — Z80.3 FAMILY HISTORY OF MALIGNANT NEOPLASM OF BREAST: ICD-10-CM

## 2023-06-06 DIAGNOSIS — F43.29 MIXED EMOTIONAL FEATURES AS ADJUSTMENT REACTION: ICD-10-CM

## 2023-06-06 DIAGNOSIS — Z00.00 VISIT FOR PREVENTIVE HEALTH EXAMINATION: ICD-10-CM

## 2023-06-06 DIAGNOSIS — Z00.00 VISIT FOR PREVENTIVE HEALTH EXAMINATION: Primary | ICD-10-CM

## 2023-06-06 DIAGNOSIS — Z13.220 SCREENING FOR LIPID DISORDERS: ICD-10-CM

## 2023-06-06 LAB
ERYTHROCYTE [DISTWIDTH] IN BLOOD BY AUTOMATED COUNT: 12.4 % (ref 10–15)
FERRITIN SERPL-MCNC: 18 NG/ML (ref 6–175)
HCT VFR BLD AUTO: 35.5 % (ref 35–47)
HGB BLD-MCNC: 11.5 G/DL (ref 11.7–15.7)
HIV 1+2 AB+HIV1 P24 AG SERPL QL IA: NONREACTIVE
MCH RBC QN AUTO: 31 PG (ref 26.5–33)
MCHC RBC AUTO-ENTMCNC: 32.4 G/DL (ref 31.5–36.5)
MCV RBC AUTO: 96 FL (ref 78–100)
PLATELET # BLD AUTO: 208 10E3/UL (ref 150–450)
RBC # BLD AUTO: 3.71 10E6/UL (ref 3.8–5.2)
TSH SERPL DL<=0.005 MIU/L-ACNC: 1.51 UIU/ML (ref 0.3–4.2)
WBC # BLD AUTO: 5.7 10E3/UL (ref 4–11)

## 2023-06-06 PROCEDURE — 87591 N.GONORRHOEAE DNA AMP PROB: CPT | Performed by: NURSE PRACTITIONER

## 2023-06-06 PROCEDURE — 87389 HIV-1 AG W/HIV-1&-2 AB AG IA: CPT

## 2023-06-06 PROCEDURE — 84443 ASSAY THYROID STIM HORMONE: CPT

## 2023-06-06 PROCEDURE — 85027 COMPLETE CBC AUTOMATED: CPT

## 2023-06-06 PROCEDURE — 99395 PREV VISIT EST AGE 18-39: CPT | Performed by: NURSE PRACTITIONER

## 2023-06-06 PROCEDURE — G0463 HOSPITAL OUTPT CLINIC VISIT: HCPCS | Performed by: NURSE PRACTITIONER

## 2023-06-06 PROCEDURE — 87624 HPV HI-RISK TYP POOLED RSLT: CPT | Performed by: NURSE PRACTITIONER

## 2023-06-06 PROCEDURE — 99213 OFFICE O/P EST LOW 20 MIN: CPT | Mod: 25 | Performed by: NURSE PRACTITIONER

## 2023-06-06 PROCEDURE — 36415 COLL VENOUS BLD VENIPUNCTURE: CPT

## 2023-06-06 PROCEDURE — 86780 TREPONEMA PALLIDUM: CPT

## 2023-06-06 PROCEDURE — 82728 ASSAY OF FERRITIN: CPT

## 2023-06-06 PROCEDURE — 87491 CHLMYD TRACH DNA AMP PROBE: CPT | Performed by: NURSE PRACTITIONER

## 2023-06-06 PROCEDURE — G0145 SCR C/V CYTO,THINLAYER,RESCR: HCPCS | Performed by: NURSE PRACTITIONER

## 2023-06-06 RX ORDER — DOXYCYCLINE 100 MG/1
CAPSULE ORAL
COMMUNITY
Start: 2022-09-09 | End: 2023-06-06

## 2023-06-06 ASSESSMENT — ANXIETY QUESTIONNAIRES
3. WORRYING TOO MUCH ABOUT DIFFERENT THINGS: NEARLY EVERY DAY
5. BEING SO RESTLESS THAT IT IS HARD TO SIT STILL: NEARLY EVERY DAY
2. NOT BEING ABLE TO STOP OR CONTROL WORRYING: NEARLY EVERY DAY
1. FEELING NERVOUS, ANXIOUS, OR ON EDGE: SEVERAL DAYS
GAD7 TOTAL SCORE: 17
7. FEELING AFRAID AS IF SOMETHING AWFUL MIGHT HAPPEN: NEARLY EVERY DAY
GAD7 TOTAL SCORE: 17
6. BECOMING EASILY ANNOYED OR IRRITABLE: SEVERAL DAYS

## 2023-06-06 ASSESSMENT — PATIENT HEALTH QUESTIONNAIRE - PHQ9
SUM OF ALL RESPONSES TO PHQ QUESTIONS 1-9: 9
5. POOR APPETITE OR OVEREATING: NEARLY EVERY DAY
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
## 2023-06-06 NOTE — PATIENT INSTRUCTIONS
Thank you for trusting us with your care!     If you need to contact us for questions about:  Symptoms, Scheduling & Medical Questions; Non-urgent (2-3 day response) Jerel message, Urgent (needing response today) [phone] (if after 3:30pm next day response)   Prescriptions: Please call your Pharmacy   Billing: [place] [phone] or [facility]:[phone]

## 2023-06-07 ENCOUNTER — MYC MEDICAL ADVICE (OUTPATIENT)
Dept: OBGYN | Facility: CLINIC | Age: 38
End: 2023-06-07
Payer: COMMERCIAL

## 2023-06-07 DIAGNOSIS — F43.29 MIXED EMOTIONAL FEATURES AS ADJUSTMENT REACTION: ICD-10-CM

## 2023-06-07 LAB
C TRACH DNA SPEC QL NAA+PROBE: NEGATIVE
N GONORRHOEA DNA SPEC QL NAA+PROBE: NEGATIVE
T PALLIDUM AB SER QL: NONREACTIVE

## 2023-06-08 RX ORDER — SERTRALINE HYDROCHLORIDE 25 MG/1
TABLET, FILM COATED ORAL
Qty: 90 TABLET | Refills: 1 | Status: SHIPPED | OUTPATIENT
Start: 2023-06-08 | End: 2023-07-24

## 2023-06-09 LAB
BKR LAB AP GYN ADEQUACY: NORMAL
BKR LAB AP GYN INTERPRETATION: NORMAL
BKR LAB AP HPV REFLEX: NORMAL
BKR LAB AP LMP: NORMAL
BKR LAB AP PREVIOUS ABNORMAL: NORMAL
PATH REPORT.COMMENTS IMP SPEC: NORMAL
PATH REPORT.COMMENTS IMP SPEC: NORMAL
PATH REPORT.RELEVANT HX SPEC: NORMAL

## 2023-06-12 LAB
HUMAN PAPILLOMA VIRUS 16 DNA: NEGATIVE
HUMAN PAPILLOMA VIRUS 18 DNA: NEGATIVE
HUMAN PAPILLOMA VIRUS FINAL DIAGNOSIS: NORMAL
HUMAN PAPILLOMA VIRUS OTHER HR: NEGATIVE

## 2023-07-10 ENCOUNTER — HOSPITAL ENCOUNTER (OUTPATIENT)
Dept: ULTRASOUND IMAGING | Facility: CLINIC | Age: 38
Discharge: HOME | End: 2023-07-10
Attending: NURSE PRACTITIONER | Admitting: NURSE PRACTITIONER
Payer: COMMERCIAL

## 2023-07-10 DIAGNOSIS — R10.2 PELVIC PAIN IN FEMALE: ICD-10-CM

## 2023-07-10 PROCEDURE — 76830 TRANSVAGINAL US NON-OB: CPT | Mod: 26 | Performed by: RADIOLOGY

## 2023-07-10 PROCEDURE — 76830 TRANSVAGINAL US NON-OB: CPT

## 2023-07-10 PROCEDURE — 76856 US EXAM PELVIC COMPLETE: CPT | Mod: 26 | Performed by: RADIOLOGY

## 2023-07-12 ENCOUNTER — MYC MEDICAL ADVICE (OUTPATIENT)
Dept: OBGYN | Facility: CLINIC | Age: 38
End: 2023-07-12
Payer: COMMERCIAL

## 2023-07-24 ENCOUNTER — MYC MEDICAL ADVICE (OUTPATIENT)
Dept: OBGYN | Facility: CLINIC | Age: 38
End: 2023-07-24
Payer: COMMERCIAL

## 2023-07-24 DIAGNOSIS — F41.9 ANXIETY: ICD-10-CM

## 2023-07-24 RX ORDER — SERTRALINE HYDROCHLORIDE 25 MG/1
25 TABLET, FILM COATED ORAL DAILY
Qty: 90 TABLET | Refills: 1 | Status: SHIPPED | OUTPATIENT
Start: 2023-07-24

## 2023-07-24 NOTE — TELEPHONE ENCOUNTER
Paper refill request received for zoloft.  MyChart sent to patient to inquire on current dose patient is taking before sending refill. Last visit 6/6/23.

## 2023-08-10 ENCOUNTER — MYC MEDICAL ADVICE (OUTPATIENT)
Dept: OBGYN | Facility: CLINIC | Age: 38
End: 2023-08-10
Payer: COMMERCIAL

## 2023-08-10 DIAGNOSIS — Z30.011 ENCOUNTER FOR INITIAL PRESCRIPTION OF CONTRACEPTIVE PILLS: Primary | ICD-10-CM

## 2023-08-11 RX ORDER — ACETAMINOPHEN AND CODEINE PHOSPHATE 120; 12 MG/5ML; MG/5ML
0.35 SOLUTION ORAL DAILY
Qty: 84 TABLET | Refills: 0 | Status: SHIPPED | OUTPATIENT
Start: 2023-08-11

## 2024-02-02 ENCOUNTER — MYC REFILL (OUTPATIENT)
Dept: OBGYN | Facility: CLINIC | Age: 39
End: 2024-02-02
Payer: COMMERCIAL

## 2024-02-02 DIAGNOSIS — F43.29 MIXED EMOTIONAL FEATURES AS ADJUSTMENT REACTION: ICD-10-CM

## 2024-08-31 ENCOUNTER — HEALTH MAINTENANCE LETTER (OUTPATIENT)
Age: 39
End: 2024-08-31